# Patient Record
Sex: MALE | Race: BLACK OR AFRICAN AMERICAN | NOT HISPANIC OR LATINO | URBAN - METROPOLITAN AREA
[De-identification: names, ages, dates, MRNs, and addresses within clinical notes are randomized per-mention and may not be internally consistent; named-entity substitution may affect disease eponyms.]

---

## 2019-09-04 ENCOUNTER — OFFICE VISIT - HEALTHEAST (OUTPATIENT)
Dept: INTERNAL MEDICINE | Facility: CLINIC | Age: 39
End: 2019-09-04

## 2019-09-04 DIAGNOSIS — F41.9 ANXIETY DISORDER, UNSPECIFIED TYPE: ICD-10-CM

## 2019-09-04 DIAGNOSIS — G47.00 INSOMNIA, UNSPECIFIED TYPE: ICD-10-CM

## 2019-09-04 DIAGNOSIS — M54.50 ACUTE BILATERAL LOW BACK PAIN WITHOUT SCIATICA: ICD-10-CM

## 2019-09-04 DIAGNOSIS — S06.9X9S TRAUMATIC BRAIN INJURY WITH LOSS OF CONSCIOUSNESS, SEQUELA (H): ICD-10-CM

## 2019-09-04 DIAGNOSIS — I10 BENIGN ESSENTIAL HYPERTENSION: ICD-10-CM

## 2019-09-04 DIAGNOSIS — Z72.0 TOBACCO ABUSE: ICD-10-CM

## 2019-09-04 DIAGNOSIS — F20.9 SCHIZOPHRENIA, UNSPECIFIED TYPE (H): ICD-10-CM

## 2019-09-04 DIAGNOSIS — J45.21 MILD INTERMITTENT ASTHMA WITH EXACERBATION: ICD-10-CM

## 2019-09-04 RX ORDER — IBUPROFEN 800 MG/1
800 TABLET, FILM COATED ORAL EVERY 8 HOURS PRN
Qty: 60 TABLET | Refills: 2 | Status: SHIPPED | OUTPATIENT
Start: 2019-09-04 | End: 2022-08-17

## 2019-09-04 RX ORDER — ACETAMINOPHEN 500 MG
1000 TABLET ORAL EVERY 6 HOURS PRN
Qty: 100 TABLET | Refills: 2 | Status: SHIPPED | OUTPATIENT
Start: 2019-09-04 | End: 2023-09-27

## 2019-09-04 ASSESSMENT — MIFFLIN-ST. JEOR: SCORE: 2045.5

## 2019-09-25 ENCOUNTER — OFFICE VISIT - HEALTHEAST (OUTPATIENT)
Dept: INTERNAL MEDICINE | Facility: CLINIC | Age: 39
End: 2019-09-25

## 2019-09-25 DIAGNOSIS — I10 BENIGN ESSENTIAL HYPERTENSION: ICD-10-CM

## 2019-09-25 DIAGNOSIS — F41.9 ANXIETY DISORDER, UNSPECIFIED TYPE: ICD-10-CM

## 2019-09-25 DIAGNOSIS — J45.21 MILD INTERMITTENT ASTHMA WITH EXACERBATION: ICD-10-CM

## 2019-09-25 DIAGNOSIS — M54.50 CHRONIC BILATERAL LOW BACK PAIN WITHOUT SCIATICA: ICD-10-CM

## 2019-09-25 DIAGNOSIS — J45.41 MODERATE PERSISTENT ASTHMA WITH EXACERBATION: ICD-10-CM

## 2019-09-25 DIAGNOSIS — Z00.00 ROUTINE GENERAL MEDICAL EXAMINATION AT A HEALTH CARE FACILITY: ICD-10-CM

## 2019-09-25 DIAGNOSIS — Z72.0 TOBACCO ABUSE: ICD-10-CM

## 2019-09-25 DIAGNOSIS — G89.29 CHRONIC BILATERAL LOW BACK PAIN WITHOUT SCIATICA: ICD-10-CM

## 2019-09-25 DIAGNOSIS — Z23 INFLUENZA VACCINATION ADMINISTERED AT CURRENT VISIT: ICD-10-CM

## 2019-09-25 LAB
ALBUMIN SERPL-MCNC: 3.9 G/DL (ref 3.5–5)
ALP SERPL-CCNC: 67 U/L (ref 45–120)
ALT SERPL W P-5'-P-CCNC: 24 U/L (ref 0–45)
ANION GAP SERPL CALCULATED.3IONS-SCNC: 10 MMOL/L (ref 5–18)
AST SERPL W P-5'-P-CCNC: 17 U/L (ref 0–40)
BASOPHILS # BLD AUTO: 0 THOU/UL (ref 0–0.2)
BASOPHILS NFR BLD AUTO: 1 % (ref 0–2)
BILIRUB SERPL-MCNC: 0.4 MG/DL (ref 0–1)
BUN SERPL-MCNC: 8 MG/DL (ref 8–22)
CALCIUM SERPL-MCNC: 9.3 MG/DL (ref 8.5–10.5)
CHLORIDE BLD-SCNC: 103 MMOL/L (ref 98–107)
CHOLEST SERPL-MCNC: 200 MG/DL
CO2 SERPL-SCNC: 27 MMOL/L (ref 22–31)
CREAT SERPL-MCNC: 1.11 MG/DL (ref 0.7–1.3)
EOSINOPHIL # BLD AUTO: 0.1 THOU/UL (ref 0–0.4)
EOSINOPHIL NFR BLD AUTO: 2 % (ref 0–6)
ERYTHROCYTE [DISTWIDTH] IN BLOOD BY AUTOMATED COUNT: 13.7 % (ref 11–14.5)
FASTING STATUS PATIENT QL REPORTED: YES
GFR SERPL CREATININE-BSD FRML MDRD: >60 ML/MIN/1.73M2
GLUCOSE BLD-MCNC: 98 MG/DL (ref 70–125)
HCT VFR BLD AUTO: 46.4 % (ref 40–54)
HDLC SERPL-MCNC: 71 MG/DL
HGB BLD-MCNC: 15.1 G/DL (ref 14–18)
HIV 1+2 AB+HIV1 P24 AG SERPL QL IA: NEGATIVE
LDLC SERPL CALC-MCNC: 111 MG/DL
LYMPHOCYTES # BLD AUTO: 1.2 THOU/UL (ref 0.8–4.4)
LYMPHOCYTES NFR BLD AUTO: 19 % (ref 20–40)
MCH RBC QN AUTO: 32.9 PG (ref 27–34)
MCHC RBC AUTO-ENTMCNC: 32.5 G/DL (ref 32–36)
MCV RBC AUTO: 101 FL (ref 80–100)
MONOCYTES # BLD AUTO: 0.5 THOU/UL (ref 0–0.9)
MONOCYTES NFR BLD AUTO: 7 % (ref 2–10)
NEUTROPHILS # BLD AUTO: 4.5 THOU/UL (ref 2–7.7)
NEUTROPHILS NFR BLD AUTO: 71 % (ref 50–70)
PLATELET # BLD AUTO: 152 THOU/UL (ref 140–440)
PMV BLD AUTO: 12.6 FL (ref 8.5–12.5)
POTASSIUM BLD-SCNC: 3.8 MMOL/L (ref 3.5–5)
PROT SERPL-MCNC: 7.1 G/DL (ref 6–8)
RBC # BLD AUTO: 4.59 MILL/UL (ref 4.4–6.2)
SODIUM SERPL-SCNC: 140 MMOL/L (ref 136–145)
TRIGL SERPL-MCNC: 92 MG/DL
TSH SERPL DL<=0.005 MIU/L-ACNC: 0.37 UIU/ML (ref 0.3–5)
WBC: 6.4 THOU/UL (ref 4–11)

## 2019-09-25 ASSESSMENT — MIFFLIN-ST. JEOR: SCORE: 2054.57

## 2019-09-26 LAB — HCV AB SERPL QL IA: NEGATIVE

## 2019-09-27 ENCOUNTER — COMMUNICATION - HEALTHEAST (OUTPATIENT)
Dept: INTERNAL MEDICINE | Facility: CLINIC | Age: 39
End: 2019-09-27

## 2020-02-19 ENCOUNTER — OFFICE VISIT - HEALTHEAST (OUTPATIENT)
Dept: INTERNAL MEDICINE | Facility: CLINIC | Age: 40
End: 2020-02-19

## 2020-02-19 DIAGNOSIS — Z00.00 ROUTINE GENERAL MEDICAL EXAMINATION AT A HEALTH CARE FACILITY: ICD-10-CM

## 2020-02-19 DIAGNOSIS — G89.29 CHRONIC BILATERAL LOW BACK PAIN WITHOUT SCIATICA: ICD-10-CM

## 2020-02-19 DIAGNOSIS — J45.21 MILD INTERMITTENT ASTHMA WITH EXACERBATION: ICD-10-CM

## 2020-02-19 DIAGNOSIS — F20.9 SCHIZOPHRENIA, UNSPECIFIED TYPE (H): ICD-10-CM

## 2020-02-19 DIAGNOSIS — I10 BENIGN ESSENTIAL HYPERTENSION: ICD-10-CM

## 2020-02-19 DIAGNOSIS — Z11.4 SCREENING FOR HIV (HUMAN IMMUNODEFICIENCY VIRUS): ICD-10-CM

## 2020-02-19 DIAGNOSIS — F41.9 ANXIETY DISORDER, UNSPECIFIED TYPE: ICD-10-CM

## 2020-02-19 DIAGNOSIS — Z11.3 SCREEN FOR STD (SEXUALLY TRANSMITTED DISEASE): ICD-10-CM

## 2020-02-19 DIAGNOSIS — M54.50 CHRONIC BILATERAL LOW BACK PAIN WITHOUT SCIATICA: ICD-10-CM

## 2020-02-19 LAB — HIV 1+2 AB+HIV1 P24 AG SERPL QL IA: NEGATIVE

## 2020-02-19 ASSESSMENT — MIFFLIN-ST. JEOR: SCORE: 2186.68

## 2020-02-20 ENCOUNTER — COMMUNICATION - HEALTHEAST (OUTPATIENT)
Dept: INTERNAL MEDICINE | Facility: CLINIC | Age: 40
End: 2020-02-20

## 2020-02-20 LAB
C TRACH DNA SPEC QL PROBE+SIG AMP: NEGATIVE
N GONORRHOEA DNA SPEC QL NAA+PROBE: NEGATIVE

## 2020-08-18 ENCOUNTER — RECORDS - HEALTHEAST (OUTPATIENT)
Dept: LAB | Facility: CLINIC | Age: 40
End: 2020-08-18

## 2020-08-19 LAB
ANION GAP SERPL CALCULATED.3IONS-SCNC: 10 MMOL/L (ref 5–18)
BASOPHILS # BLD AUTO: 0.1 THOU/UL (ref 0–0.2)
BASOPHILS NFR BLD AUTO: 1 % (ref 0–2)
BUN SERPL-MCNC: 16 MG/DL (ref 8–22)
CALCIUM SERPL-MCNC: 9.8 MG/DL (ref 8.5–10.5)
CHLORIDE BLD-SCNC: 101 MMOL/L (ref 98–107)
CO2 SERPL-SCNC: 27 MMOL/L (ref 22–31)
CREAT SERPL-MCNC: 1.03 MG/DL (ref 0.7–1.3)
EOSINOPHIL # BLD AUTO: 0.4 THOU/UL (ref 0–0.4)
EOSINOPHIL NFR BLD AUTO: 3 % (ref 0–6)
ERYTHROCYTE [DISTWIDTH] IN BLOOD BY AUTOMATED COUNT: 12.9 % (ref 11–14.5)
GFR SERPL CREATININE-BSD FRML MDRD: >60 ML/MIN/1.73M2
GLUCOSE BLD-MCNC: 89 MG/DL (ref 70–125)
HCT VFR BLD AUTO: 32 % (ref 40–54)
HGB BLD-MCNC: 9.9 G/DL (ref 14–18)
IMM GRANULOCYTES # BLD: 0.3 THOU/UL
IMM GRANULOCYTES NFR BLD: 2 %
LYMPHOCYTES # BLD AUTO: 2.3 THOU/UL (ref 0.8–4.4)
LYMPHOCYTES NFR BLD AUTO: 18 % (ref 20–40)
MCH RBC QN AUTO: 30.2 PG (ref 27–34)
MCHC RBC AUTO-ENTMCNC: 30.9 G/DL (ref 32–36)
MCV RBC AUTO: 98 FL (ref 80–100)
MONOCYTES # BLD AUTO: 0.9 THOU/UL (ref 0–0.9)
MONOCYTES NFR BLD AUTO: 8 % (ref 2–10)
NEUTROPHILS # BLD AUTO: 8.5 THOU/UL (ref 2–7.7)
NEUTROPHILS NFR BLD AUTO: 68 % (ref 50–70)
PLATELET # BLD AUTO: 406 THOU/UL (ref 140–440)
PMV BLD AUTO: 10.3 FL (ref 8.5–12.5)
POTASSIUM BLD-SCNC: 4 MMOL/L (ref 3.5–5)
RBC # BLD AUTO: 3.28 MILL/UL (ref 4.4–6.2)
SODIUM SERPL-SCNC: 138 MMOL/L (ref 136–145)
WBC: 12.5 THOU/UL (ref 4–11)

## 2020-09-08 ENCOUNTER — COMMUNICATION - HEALTHEAST (OUTPATIENT)
Dept: INTERNAL MEDICINE | Facility: CLINIC | Age: 40
End: 2020-09-08

## 2020-09-10 ENCOUNTER — OFFICE VISIT - HEALTHEAST (OUTPATIENT)
Dept: INTERNAL MEDICINE | Facility: CLINIC | Age: 40
End: 2020-09-10

## 2020-09-10 DIAGNOSIS — F10.20 SEVERE ALCOHOL USE DISORDER (H): ICD-10-CM

## 2020-09-10 DIAGNOSIS — S82.141A CLOSED FRACTURE OF RIGHT TIBIAL PLATEAU, INITIAL ENCOUNTER: ICD-10-CM

## 2020-09-10 DIAGNOSIS — F20.9 SCHIZOPHRENIA, UNSPECIFIED TYPE (H): ICD-10-CM

## 2020-09-10 DIAGNOSIS — T79.A21A TRAUMATIC COMPARTMENT SYNDROME OF RIGHT LOWER EXTREMITY, INITIAL ENCOUNTER (H): ICD-10-CM

## 2020-09-10 ASSESSMENT — MIFFLIN-ST. JEOR: SCORE: 2281.94

## 2020-09-11 ENCOUNTER — RECORDS - HEALTHEAST (OUTPATIENT)
Dept: ADMINISTRATIVE | Facility: OTHER | Age: 40
End: 2020-09-11

## 2020-09-16 ENCOUNTER — COMMUNICATION - HEALTHEAST (OUTPATIENT)
Dept: INTERNAL MEDICINE | Facility: CLINIC | Age: 40
End: 2020-09-16

## 2020-09-16 ENCOUNTER — RECORDS - HEALTHEAST (OUTPATIENT)
Dept: ADMINISTRATIVE | Facility: OTHER | Age: 40
End: 2020-09-16

## 2020-09-16 ENCOUNTER — COMMUNICATION - HEALTHEAST (OUTPATIENT)
Dept: SCHEDULING | Facility: CLINIC | Age: 40
End: 2020-09-16

## 2020-09-16 DIAGNOSIS — I10 BENIGN ESSENTIAL HYPERTENSION: ICD-10-CM

## 2020-09-23 ENCOUNTER — RECORDS - HEALTHEAST (OUTPATIENT)
Dept: ADMINISTRATIVE | Facility: OTHER | Age: 40
End: 2020-09-23

## 2020-09-24 ENCOUNTER — RECORDS - HEALTHEAST (OUTPATIENT)
Dept: ADMINISTRATIVE | Facility: OTHER | Age: 40
End: 2020-09-24

## 2020-09-29 ENCOUNTER — RECORDS - HEALTHEAST (OUTPATIENT)
Dept: ADMINISTRATIVE | Facility: OTHER | Age: 40
End: 2020-09-29

## 2020-10-06 ENCOUNTER — RECORDS - HEALTHEAST (OUTPATIENT)
Dept: LAB | Facility: CLINIC | Age: 40
End: 2020-10-06

## 2020-10-07 LAB
ANION GAP SERPL CALCULATED.3IONS-SCNC: 9 MMOL/L (ref 5–18)
BUN SERPL-MCNC: 17 MG/DL (ref 8–22)
CALCIUM SERPL-MCNC: 9.2 MG/DL (ref 8.5–10.5)
CHLORIDE BLD-SCNC: 101 MMOL/L (ref 98–107)
CO2 SERPL-SCNC: 30 MMOL/L (ref 22–31)
CREAT SERPL-MCNC: 1.03 MG/DL (ref 0.7–1.3)
GFR SERPL CREATININE-BSD FRML MDRD: >60 ML/MIN/1.73M2
GLUCOSE BLD-MCNC: 106 MG/DL (ref 70–125)
POTASSIUM BLD-SCNC: 3.5 MMOL/L (ref 3.5–5)
SODIUM SERPL-SCNC: 140 MMOL/L (ref 136–145)

## 2020-10-15 ENCOUNTER — RECORDS - HEALTHEAST (OUTPATIENT)
Dept: LAB | Facility: CLINIC | Age: 40
End: 2020-10-15

## 2020-10-19 LAB
ANION GAP SERPL CALCULATED.3IONS-SCNC: 7 MMOL/L (ref 5–18)
BUN SERPL-MCNC: 15 MG/DL (ref 8–22)
CALCIUM SERPL-MCNC: 9.8 MG/DL (ref 8.5–10.5)
CHLORIDE BLD-SCNC: 100 MMOL/L (ref 98–107)
CO2 SERPL-SCNC: 31 MMOL/L (ref 22–31)
CREAT SERPL-MCNC: 0.96 MG/DL (ref 0.7–1.3)
GFR SERPL CREATININE-BSD FRML MDRD: >60 ML/MIN/1.73M2
GLUCOSE BLD-MCNC: 83 MG/DL (ref 70–125)
POTASSIUM BLD-SCNC: 4.1 MMOL/L (ref 3.5–5)
SODIUM SERPL-SCNC: 138 MMOL/L (ref 136–145)

## 2020-10-20 ENCOUNTER — RECORDS - HEALTHEAST (OUTPATIENT)
Dept: LAB | Facility: CLINIC | Age: 40
End: 2020-10-20

## 2020-10-21 LAB
ANION GAP SERPL CALCULATED.3IONS-SCNC: 10 MMOL/L (ref 5–18)
BUN SERPL-MCNC: 11 MG/DL (ref 8–22)
CALCIUM SERPL-MCNC: 9.7 MG/DL (ref 8.5–10.5)
CHLORIDE BLD-SCNC: 101 MMOL/L (ref 98–107)
CO2 SERPL-SCNC: 29 MMOL/L (ref 22–31)
CREAT SERPL-MCNC: 0.95 MG/DL (ref 0.7–1.3)
GFR SERPL CREATININE-BSD FRML MDRD: >60 ML/MIN/1.73M2
GLUCOSE BLD-MCNC: 99 MG/DL (ref 70–125)
POTASSIUM BLD-SCNC: 4.1 MMOL/L (ref 3.5–5)
SODIUM SERPL-SCNC: 140 MMOL/L (ref 136–145)

## 2020-10-23 ENCOUNTER — RECORDS - HEALTHEAST (OUTPATIENT)
Dept: ADMINISTRATIVE | Facility: OTHER | Age: 40
End: 2020-10-23

## 2020-10-23 ENCOUNTER — RECORDS - HEALTHEAST (OUTPATIENT)
Dept: LAB | Facility: CLINIC | Age: 40
End: 2020-10-23

## 2020-10-26 LAB
ANION GAP SERPL CALCULATED.3IONS-SCNC: 11 MMOL/L (ref 5–18)
BUN SERPL-MCNC: 14 MG/DL (ref 8–22)
CALCIUM SERPL-MCNC: 9.7 MG/DL (ref 8.5–10.5)
CHLORIDE BLD-SCNC: 101 MMOL/L (ref 98–107)
CO2 SERPL-SCNC: 25 MMOL/L (ref 22–31)
CREAT SERPL-MCNC: 0.93 MG/DL (ref 0.7–1.3)
GFR SERPL CREATININE-BSD FRML MDRD: >60 ML/MIN/1.73M2
GLUCOSE BLD-MCNC: 89 MG/DL (ref 70–125)
POTASSIUM BLD-SCNC: 3.7 MMOL/L (ref 3.5–5)
SODIUM SERPL-SCNC: 137 MMOL/L (ref 136–145)

## 2021-01-27 ENCOUNTER — COMMUNICATION - HEALTHEAST (OUTPATIENT)
Dept: FAMILY MEDICINE | Facility: CLINIC | Age: 41
End: 2021-01-27

## 2021-01-27 DIAGNOSIS — F41.9 ANXIETY DISORDER, UNSPECIFIED TYPE: ICD-10-CM

## 2021-01-27 DIAGNOSIS — I10 BENIGN ESSENTIAL HYPERTENSION: ICD-10-CM

## 2021-01-27 DIAGNOSIS — J45.21 MILD INTERMITTENT ASTHMA WITH EXACERBATION: ICD-10-CM

## 2021-01-27 DIAGNOSIS — F20.9 SCHIZOPHRENIA, UNSPECIFIED TYPE (H): ICD-10-CM

## 2021-01-27 DIAGNOSIS — G47.00 INSOMNIA, UNSPECIFIED TYPE: ICD-10-CM

## 2021-02-01 ENCOUNTER — COMMUNICATION - HEALTHEAST (OUTPATIENT)
Dept: SCHEDULING | Facility: CLINIC | Age: 41
End: 2021-02-01

## 2021-02-01 DIAGNOSIS — J45.21 MILD INTERMITTENT ASTHMA WITH EXACERBATION: ICD-10-CM

## 2021-05-28 ASSESSMENT — ASTHMA QUESTIONNAIRES
ACT_TOTALSCORE: 25
ACT_TOTALSCORE: 12
ACT_TOTALSCORE: 25
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
## 2021-06-03 VITALS
OXYGEN SATURATION: 99 % | BODY MASS INDEX: 28.72 KG/M2 | DIASTOLIC BLOOD PRESSURE: 98 MMHG | HEART RATE: 66 BPM | WEIGHT: 231 LBS | SYSTOLIC BLOOD PRESSURE: 158 MMHG | TEMPERATURE: 98.4 F | HEIGHT: 75 IN

## 2021-06-03 VITALS
DIASTOLIC BLOOD PRESSURE: 90 MMHG | HEART RATE: 82 BPM | WEIGHT: 233 LBS | BODY MASS INDEX: 28.97 KG/M2 | OXYGEN SATURATION: 97 % | SYSTOLIC BLOOD PRESSURE: 138 MMHG | HEIGHT: 75 IN

## 2021-06-04 VITALS
HEIGHT: 74 IN | OXYGEN SATURATION: 96 % | BODY MASS INDEX: 33.75 KG/M2 | DIASTOLIC BLOOD PRESSURE: 82 MMHG | SYSTOLIC BLOOD PRESSURE: 122 MMHG | WEIGHT: 263 LBS | HEART RATE: 84 BPM

## 2021-06-05 VITALS
HEIGHT: 74 IN | OXYGEN SATURATION: 97 % | BODY MASS INDEX: 36.45 KG/M2 | HEART RATE: 89 BPM | DIASTOLIC BLOOD PRESSURE: 78 MMHG | WEIGHT: 284 LBS | SYSTOLIC BLOOD PRESSURE: 132 MMHG

## 2021-06-06 NOTE — PROGRESS NOTES
Office Visit - Physical   Valente Pope Jr.   39 y.o.  male    Date of visit: 2/19/2020  Physician: Steven Mcgovern MD     Assessment and Plan   1. Routine general medical examination at a health care facility  This is a 39-year-old man with issues as discussed below    2. Mild intermittent asthma with exacerbation  Stable but discussed importance of smoking cessation.  Discussed FDA approved smoking cessation modalities including pharmacologic therapy and nicotine supplementation.  He will consider these  - albuterol (PROAIR HFA;PROVENTIL HFA;VENTOLIN HFA) 90 mcg/actuation inhaler; Inhale 1-2 puffs every 4 (four) hours as needed for wheezing.  Dispense: 1 Inhaler; Refill: 11    3. Schizophrenia, unspecified type (H)  Currently on risperidone Seroquel Wellbutrin and trazodone.  I discussed with him that he will need ongoing treatment from a psychiatrist and recommended he [facility]    4. Benign essential hypertension  Blood pressure controlled continue current medications, labs recently reviewed and look okay    5. Anxiety disorder, unspecified type  As above    6. Chronic bilateral low back pain without sciatica  Stable, no red flags, continue ibuprofen as needed    7. Screening for HIV (human immunodeficiency virus)   HIV Antigen/Antibody Screening [place]    8. Screen for STD (sexually transmitted disease)  - Chlamydia trachomatis & Neisseria gonorrhoeae, Amplified Detection    Return in about 3 months (around 5/19/2020) for recheck.     Chief Complaint   Chief Complaint   Patient presents with     Annual Exam     Establish Care        Patient Profile   Social History     Social History Narrative    Lives with his Auntie.  Not working.        Past Medical History   Patient Active Problem List   Diagnosis     Anxiety disorder     Schizophrenia (H) - Sharona Tabor, [facility]     Benign essential hypertension     Mild intermittent asthma with exacerbation      Tobacco abuse     TBI (traumatic brain injury) (H)     Bilateral low back pain without sciatica       Past Surgical History  He has a past surgical history that includes No past surgeries.     History of Present Illness   This 39 y.o. old man comes in to \[facility]\"" care and for annual physical.  Previous notes from Dr. Walt Rios reviewed.  Overall doing okay.  Remains sober.  Tolerating medications for schizophrenia.  Not currently seeing a psychiatrist.  Asthma generally stable.  Continues to smoke cigarettes is interested in quitting.  Has had high blood pressure but under excellent control in the guidance of Dr. Marco wheeler.  No lightheadedness dizziness chest pain.  Some back pain which is chronic and stable.    Review of Systems: A comprehensive review of systems was negative except as noted.     Medications and Allergies   Current Outpatient Medications   Medication Sig Dispense Refill     acetaminophen (TYLENOL EXTRA STRENGTH) 500 MG tablet Take 2 tablets (1,000 mg total) by mouth every 6 (six) hours as needed for pain. 100 tablet 2     albuterol (PROAIR HFA;PROVENTIL HFA;VENTOLIN HFA) 90 mcg/actuation inhaler Inhale 1-2 puffs every 4 (four) hours as needed for wheezing. 1 Inhaler 11     atenolol (TENORMIN) 50 MG tablet Take 1 tablet (50 mg total) by mouth daily. 30 tablet 11     buPROPion (WELLBUTRIN XL) 300 MG 24 hr tablet Take 1 tablet (300 mg total) by mouth daily. 30 tablet 11     fluticasone propionate (FLOVENT HFA) 44 mcg/actuation inhaler Inhale 2 puffs 2 (two) times a day. 1 Inhaler 2     hydroCHLOROthiazide (HYDRODIURIL) 12.5 MG tablet Take 1 tablet (12.5 mg total) by mouth daily. 30 tablet 11     ibuprofen (ADVIL,MOTRIN) 800 MG tablet Take 1 tablet (800 mg total) by mouth every 8 (eight) hours as needed for pain. 60 tablet 2     QUEtiapine (SEROQUEL) 50 MG tablet Take 1 tablet (50 mg total) by mouth at bedtime. 30 tablet 11     risperiDONE (RISPERDAL) 1 MG tablet Take 1 tablet (1 mg total) by  "mouth 2 (two) times a day. 60 tablet 11     traZODone (DESYREL) 100 MG tablet Take 1 tablet (100 mg total) by mouth at bedtime. 30 tablet 11     No current facility-administered medications for this visit.      Allergies   Allergen Reactions     Lisinopril Angioedema     Penicillins Other (See Comments) and Swelling     Other reaction(s): Throat Swelling/Closing        Family and Social History   Family History   Problem Relation Age of Onset     Hypertension Father      Hypertension Mother      No Medical Problems Sister      No Medical Problems Brother      No Medical Problems Sister      No Medical Problems Sister      No Medical Problems Brother      No Medical Problems Daughter         Social History     Tobacco Use     Smoking status: Current Every Day Smoker     Types: Cigarettes     Smokeless tobacco: Never Used   Substance Use Topics     Alcohol use: Not Currently     Drug use: Not Currently        Physical Exam   General Appearance:   No acute distress    /82 (Patient Site: Left Arm, Patient Position: Sitting, Cuff Size: Adult Regular)   Pulse 84   Ht 6' 2.25\" (1.886 m)   Wt (!) 263 lb (119.3 kg)   SpO2 96%   BMI 33.54 kg/m      EYES: Eyelids, conjunctiva, and sclera were normal. Pupils were normal. Cornea, iris, and lens were normal bilaterally.  HEAD, EARS, NOSE, MOUTH, AND THROAT: Head and face were normal. Hearing was normal to voice and the ears were normal to external exam. Nose appearance was normal and there was no discharge. Oropharynx was normal.  NECK: Neck appearance was normal. There were no neck masses and the thyroid was not enlarged.  RESPIRATORY: Breathing pattern was normal and the chest moved symmetrically.  Percussion/auscultatory percussion was normal.  Lung sounds were normal and there were no abnormal sounds.  CARDIOVASCULAR: Heart rate and rhythm were normal.  S1 and S2 were normal and there were no extra sounds or murmurs. Peripheral pulses in arms and legs were normal.  " Jugular venous pressure was normal.  There was no peripheral edema.  GASTROINTESTINAL: The abdomen was normal in contour.  Bowel sounds were present.  Percussion detected no organ enlargement or tenderness.  Palpation detected no tenderness, mass, or enlarged organs.   MUSCULOSKELETAL: Skeletal configuration was normal and muscle mass was normal for age. Joint appearance was overall normal.  LYMPHATIC: There were no enlarged nodes.  SKIN/HAIR/NAILS: Skin color was normal.  There were no skin lesions.  Hair and nails were normal.  NEUROLOGIC: The patient was alert and oriented to person, place, time, and circumstance. Speech was normal. Cranial nerves were normal. Motor strength was normal for age. The patient was normally coordinated.  PSYCHIATRIC:  Mood and affect were normal and the patient had normal recent and remote memory. The patient's judgment and insight were normal.       Additional Information        Steven Mcgovern MD  Internal Medicine  Contact me at None

## 2021-06-08 ENCOUNTER — COMMUNICATION - HEALTHEAST (OUTPATIENT)
Dept: INTERNAL MEDICINE | Facility: CLINIC | Age: 41
End: 2021-06-08

## 2021-06-08 DIAGNOSIS — I10 BENIGN ESSENTIAL HYPERTENSION: ICD-10-CM

## 2021-06-08 DIAGNOSIS — G47.00 INSOMNIA, UNSPECIFIED TYPE: ICD-10-CM

## 2021-06-08 DIAGNOSIS — M54.50 CHRONIC BILATERAL LOW BACK PAIN WITHOUT SCIATICA: ICD-10-CM

## 2021-06-08 DIAGNOSIS — G89.29 CHRONIC BILATERAL LOW BACK PAIN WITHOUT SCIATICA: ICD-10-CM

## 2021-06-08 DIAGNOSIS — F41.9 ANXIETY DISORDER, UNSPECIFIED TYPE: ICD-10-CM

## 2021-06-08 DIAGNOSIS — F20.9 SCHIZOPHRENIA, UNSPECIFIED TYPE (H): ICD-10-CM

## 2021-06-08 DIAGNOSIS — J45.21 MILD INTERMITTENT ASTHMA WITH EXACERBATION: ICD-10-CM

## 2021-06-09 RX ORDER — HYDROCHLOROTHIAZIDE 25 MG/1
25 TABLET ORAL DAILY
Qty: 90 TABLET | Refills: 1 | Status: SHIPPED | OUTPATIENT
Start: 2021-06-09 | End: 2022-02-07

## 2021-06-09 RX ORDER — QUETIAPINE FUMARATE 50 MG/1
50 TABLET, FILM COATED ORAL AT BEDTIME
Qty: 90 TABLET | Refills: 1 | Status: SHIPPED | OUTPATIENT
Start: 2021-06-09 | End: 2022-02-07

## 2021-06-09 RX ORDER — HYDROXYZINE HYDROCHLORIDE 25 MG/1
25-50 TABLET, FILM COATED ORAL EVERY 8 HOURS PRN
Qty: 90 TABLET | Refills: 1 | Status: SHIPPED | OUTPATIENT
Start: 2021-06-09 | End: 2022-02-07

## 2021-06-09 RX ORDER — ATENOLOL 50 MG/1
50 TABLET ORAL DAILY
Qty: 90 TABLET | Refills: 1 | Status: SHIPPED | OUTPATIENT
Start: 2021-06-09 | End: 2022-02-07

## 2021-06-09 RX ORDER — TRAZODONE HYDROCHLORIDE 100 MG/1
100 TABLET ORAL AT BEDTIME
Qty: 90 TABLET | Refills: 1 | Status: SHIPPED | OUTPATIENT
Start: 2021-06-09 | End: 2022-02-07

## 2021-06-09 RX ORDER — BUPROPION HYDROCHLORIDE 300 MG/1
300 TABLET ORAL DAILY
Qty: 90 TABLET | Refills: 1 | Status: SHIPPED | OUTPATIENT
Start: 2021-06-09 | End: 2022-02-07

## 2021-06-09 RX ORDER — GABAPENTIN 300 MG/1
300 CAPSULE ORAL DAILY
Qty: 90 CAPSULE | Refills: 1 | Status: SHIPPED | OUTPATIENT
Start: 2021-06-09 | End: 2022-02-07

## 2021-06-09 RX ORDER — ALBUTEROL SULFATE 90 UG/1
1-2 AEROSOL, METERED RESPIRATORY (INHALATION) EVERY 4 HOURS PRN
Qty: 1 INHALER | Refills: 5 | Status: SHIPPED | OUTPATIENT
Start: 2021-06-09 | End: 2022-02-07

## 2021-06-09 RX ORDER — RISPERIDONE 1 MG/1
1 TABLET ORAL 2 TIMES DAILY
Qty: 180 TABLET | Refills: 1 | Status: SHIPPED | OUTPATIENT
Start: 2021-06-09 | End: 2022-02-07

## 2021-06-09 RX ORDER — CYCLOBENZAPRINE HCL 10 MG
10 TABLET ORAL 2 TIMES DAILY PRN
Qty: 90 TABLET | Refills: 1 | Status: SHIPPED | OUTPATIENT
Start: 2021-06-09 | End: 2022-08-17

## 2021-06-11 NOTE — TELEPHONE ENCOUNTER
[facility] care nurse.  [phone]  doing Ist homecare visit  post surgical.    Patient reported to have;   High BP.    185/110  Just now.    Takes atenolol  [facility] reports;   Just took Atenolol.now , because he states he forget to take it earlier.    He was taken off of hydrochlorothiazide.   And he reports he does not know why he was taken of of it.    Completed his Lovenox, and has mild swelling in his right leg.    Surgery for right tibial plataeu fx; with emergent fasciotomy and compartment syndrome.    Call patient directly if he will get a prescription for HCTZ, and update Jeannine as well.  Patient   [phone]    Ronit Akins RN  [facility] Triage/refill nurse

## 2021-06-11 NOTE — PROGRESS NOTES
Office Visit - Follow Up   Valente Pope Jr.   40 y.o. male    Date of Visit: 9/10/2020    Chief Complaint   Patient presents with     Hospital Visit Follow Up        Assessment and Plan   1. Traumatic compartment syndrome of right lower extremity, initial encounter (H)  2. Closed fracture of right tibial plateau, initial encounter  Patient had fasciotomies on 8/8/2020 and ORIF of the tibial plateau fracture on 8/12/2020.  Pain management solely per his orthopedic surgeon who is recommending hydroxyzine, gabapentin, muscle relaxant and ibuprofen.  I discussed that any opioids would need to come from his surgeon.  He is to remain nonweightbearing for 12 weeks.  He is working with physical therapy and has a home nurse.  He is going to have remaining sutures removed at follow-up visit with Dr. Sharon starks from.    3. Schizophrenia, unspecified type (H)  Continue current medications follow-up with psychiatry    4. Severe alcohol use disorder (H)  Discussed importance of sobriety, not currently in treatment    Return in about 4 weeks (around 10/8/2020) for recheck.     History of Present Illness   This 40 y.o. old man comes in for hospital follow-up.  He was admitted to [facility] in the first part of August.  He had been drinking alcohol fell down the stairs.  He injured his knee went to sleep woke up and had a lot of pain.  It appears that he developed compartment syndrome along with a tibial plateau fracture.  He underwent fasciotomies and fixation.  He is in a straight leg brace.  He was discharged to a skilled nursing facility is now at home.  He has home care including skilled nursing and physical therapy and an aide.  He was on oxycodone time discharge and his surgeon has decided that this is no longer required for pain control and has prescribed hydroxyzine ibuprofen gabapentin and muscle relaxant.  His main concern today is pain and would like to start back on oxycodone.  He states he is  "not drinking alcohol.  It is hard to ascertain what other medications he is taking as this focuses mostly on oxycodone.    Review of Systems: A comprehensive review of systems was negative except as noted.     Medications, Allergies and Problem List   Reviewed, reconciled and updated  Post Discharge Medication Reconciliation Status:      Physical Exam   General Appearance:   No acute distress    /78 (Patient Site: Left Arm, Patient Position: Sitting, Cuff Size: Adult Regular)   Pulse 89   Ht 6' 2.25\" (1.886 m)   Wt (!) 284 lb (128.8 kg)   SpO2 97%   BMI 36.22 kg/m      His right leg is in a brace locked at 10 degrees.     Additional Information   Current Outpatient Medications   Medication Sig Dispense Refill     acetaminophen (TYLENOL EXTRA STRENGTH) 500 MG tablet Take 2 tablets (1,000 mg total) by mouth every 6 (six) hours as needed for pain. 100 tablet 2     albuterol (PROAIR HFA;PROVENTIL HFA;VENTOLIN HFA) 90 mcg/actuation inhaler Inhale 1-2 puffs every 4 (four) hours as needed for wheezing. 1 Inhaler 11     aspirin 81 MG EC tablet Take 162 mg by mouth.       atenolol (TENORMIN) 50 MG tablet Take 1 tablet (50 mg total) by mouth daily. 30 tablet 11     buPROPion (WELLBUTRIN XL) 300 MG 24 hr tablet Take 1 tablet (300 mg total) by mouth daily. 30 tablet 11     cyclobenzaprine (FLEXERIL) 10 MG tablet Take 10 mg by mouth.       fluticasone propionate (FLOVENT HFA) 44 mcg/actuation inhaler Inhale 2 puffs 2 (two) times a day. 1 Inhaler 2     gabapentin (NEURONTIN) 300 MG capsule Take 300 mg by mouth.       hydroCHLOROthiazide (HYDRODIURIL) 12.5 MG tablet Take 1 tablet (12.5 mg total) by mouth daily. 30 tablet 11     hydrOXYzine HCL (ATARAX) 25 MG tablet Take 25-50 mg by mouth.       ibuprofen (ADVIL,MOTRIN) 800 MG tablet Take 1 tablet (800 mg total) by mouth every 8 (eight) hours as needed for pain. 60 tablet 2     QUEtiapine (SEROQUEL) 50 MG tablet Take 1 tablet (50 mg total) by mouth at bedtime. 30 tablet " 11     risperiDONE (RISPERDAL) 1 MG tablet Take 1 tablet (1 mg total) by mouth 2 (two) times a day. 60 tablet 11     traZODone (DESYREL) 100 MG tablet Take 1 tablet (100 mg total) by mouth at bedtime. 30 tablet 11     No current facility-administered medications for this visit.      Allergies   Allergen Reactions     Lisinopril Angioedema     Penicillins Other (See Comments) and Swelling     Other reaction(s): Throat Swelling/Closing     Social History     Tobacco Use     Smoking status: Current Every Day Smoker     Types: Cigarettes     Smokeless tobacco: Never Used   Substance Use Topics     Alcohol use: Not Currently     Drug use: Not Currently       Review and/or order of clinical lab tests: Reviewed  Review and/or order of radiology tests: Reviewed  Review and/or order of medicine tests:  Discussion of test results with performing physician:  Decision to obtain old records and/or obtain history from someone other than the patient:  Review and summarization of old records and/or obtaining history from someone other than the patient and.or discussion of case with another health care provider: Reviewed his history and physical, discharge summary, consultative notes and operative note, summarized above I also reviewed Sharon Cosby's note from 8/31/2020.  Independent visualization of image, tracing or specimen itself:    Time:      Steven Mcgovern MD

## 2021-06-11 NOTE — TELEPHONE ENCOUNTER
Who is calling:  Crystal   Reason for Call:  Caller is just wanting to check to see if Steven Mcgovern MD will be the  for orders for this patient.   Date of last appointment with primary care:   Okay to leave a detailed message: Yes

## 2021-06-11 NOTE — TELEPHONE ENCOUNTER
Prescription set up and sent to PCP to sign. Patient notified. Left message to notify Jeannine of addition of HCTZ again.    Brittney Gerard, CMA

## 2021-06-14 NOTE — TELEPHONE ENCOUNTER
Refill Approved    Rx renewed per Medication Renewal Policy. Medication was last renewed on 9/4/19.2/19/20.    Edelmira Tony, [facility] Connection Triage/Med Refill 1/27/2021     Requested Prescriptions   Pending Prescriptions Disp Refills     albuterol (PROAIR HFA;PROVENTIL HFA;VENTOLIN HFA) 90 mcg/actuation inhaler 1 Inhaler 11     Sig: Inhale 1-2 puffs every 4 (four) hours as needed for wheezing.       Albuterol/Levalbuterol Refill Protocol Passed - 1/27/2021 11:36 AM        Passed - PCP or prescribing provider visit in last year     Last office visit with prescriber/PCP: 9/10/2020 Steven Mcgovern MD OR same dept: Visit date not found OR same specialty: Visit date not found Last physical: 2/19/2020       Next appt within 3 mo: Visit date not found  Next physical within 3 mo: Visit date not found  Prescriber OR PCP: Steven Mcgovern MD  Last diagnosis associated with med order: 1. Mild intermittent asthma with exacerbation  - albuterol (PROAIR HFA;PROVENTIL HFA;VENTOLIN HFA) 90 mcg/actuation inhaler; Inhale 1-2 puffs every 4 (four) hours as needed for wheezing.  Dispense: 1 Inhaler; Refill: 11    2. Benign essential hypertension  - atenoloL (TENORMIN) 50 MG tablet; Take 1 tablet (50 mg total) by mouth daily.  Dispense: 30 tablet; Refill: 11  - hydroCHLOROthiazide (HYDRODIURIL) 25 MG tablet; Take 1 tablet (25 mg total) by mouth daily.  Dispense: 90 tablet; Refill: 3    3. Anxiety disorder, unspecified type  - buPROPion (WELLBUTRIN XL) 300 MG 24 hr tablet; Take 1 tablet (300 mg total) by mouth daily.  Dispense: 30 tablet; Refill: 11    4. Schizophrenia, unspecified type (H)  - buPROPion (WELLBUTRIN XL) 300 MG 24 hr tablet; Take 1 tablet (300 mg total) by mouth daily.  Dispense: 30 tablet; Refill: 11  - QUEtiapine (SEROQUEL) 50 MG tablet; Take 1 tablet (50 mg total) by mouth at bedtime.  Dispense: 30 tablet; Refill: 11  - risperiDONE (RISPERDAL) 1 MG tablet; Take 1 tablet (1 mg total) by mouth 2 (two) times a  day.  Dispense: 60 tablet; Refill: 11    5. Insomnia, unspecified type  - traZODone (DESYREL) 100 MG tablet; Take 1 tablet (100 mg total) by mouth at bedtime.  Dispense: 30 tablet; Refill: 11    If protocol passes may refill for 6 months if within 3 months of last provider visit (or a total of 9 months). If patient requesting >1 inhaler per month refill x 6 months and have patient make appointment with provider.             atenoloL (TENORMIN) 50 MG tablet 30 tablet 11     Sig: Take 1 tablet (50 mg total) by mouth daily.       Beta-Blockers Refill Protocol Passed - 1/27/2021 11:36 AM        Passed - PCP or prescribing provider visit in past 12 months or next 3 months     Last office visit with prescriber/PCP: 9/10/2020 Steven Mcgovern MD OR same dept: Visit date not found OR same specialty: Visit date not found  Last physical: 2/19/2020 Last MTM visit: Visit date not found   Next visit within 3 mo: Visit date not found  Next physical within 3 mo: Visit date not found  Prescriber OR PCP: Steven Mcgovern MD  Last diagnosis associated with med order: 1. Mild intermittent asthma with exacerbation  - albuterol (PROAIR HFA;PROVENTIL HFA;VENTOLIN HFA) 90 mcg/actuation inhaler; Inhale 1-2 puffs every 4 (four) hours as needed for wheezing.  Dispense: 1 Inhaler; Refill: 11    2. Benign essential hypertension  - atenoloL (TENORMIN) 50 MG tablet; Take 1 tablet (50 mg total) by mouth daily.  Dispense: 30 tablet; Refill: 11  - hydroCHLOROthiazide (HYDRODIURIL) 25 MG tablet; Take 1 tablet (25 mg total) by mouth daily.  Dispense: 90 tablet; Refill: 3    3. Anxiety disorder, unspecified type  - buPROPion (WELLBUTRIN XL) 300 MG 24 hr tablet; Take 1 tablet (300 mg total) by mouth daily.  Dispense: 30 tablet; Refill: 11    4. Schizophrenia, unspecified type (H)  - buPROPion (WELLBUTRIN XL) 300 MG 24 hr tablet; Take 1 tablet (300 mg total) by mouth daily.  Dispense: 30 tablet; Refill: 11  - QUEtiapine (SEROQUEL) 50 MG tablet;  Take 1 tablet (50 mg total) by mouth at bedtime.  Dispense: 30 tablet; Refill: 11  - risperiDONE (RISPERDAL) 1 MG tablet; Take 1 tablet (1 mg total) by mouth 2 (two) times a day.  Dispense: 60 tablet; Refill: 11    5. Insomnia, unspecified type  - traZODone (DESYREL) 100 MG tablet; Take 1 tablet (100 mg total) by mouth at bedtime.  Dispense: 30 tablet; Refill: 11    If protocol passes may refill for 12 months if within 3 months of last provider visit (or a total of 15 months).             Passed - Blood pressure filed in past 12 months     BP Readings from Last 1 Encounters:   09/10/20 132/78                buPROPion (WELLBUTRIN XL) 300 MG 24 hr tablet 30 tablet 11     Sig: Take 1 tablet (300 mg total) by mouth daily.       Tricyclics/Misc Antidepressant/Antianxiety Meds Refill Protocol Passed - 1/27/2021 11:36 AM        Passed - PCP or prescribing provider visit in last year     Last office visit with prescriber/PCP: 9/10/2020 Steven Mcgovern MD OR same dept: Visit date not found OR same specialty: Visit date not found  Last physical: 2/19/2020 Last MTM visit: Visit date not found   Next visit within 3 mo: Visit date not found  Next physical within 3 mo: Visit date not found  Prescriber OR PCP: Steven Mcgovern MD  Last diagnosis associated with med order: 1. Mild intermittent asthma with exacerbation  - albuterol (PROAIR HFA;PROVENTIL HFA;VENTOLIN HFA) 90 mcg/actuation inhaler; Inhale 1-2 puffs every 4 (four) hours as needed for wheezing.  Dispense: 1 Inhaler; Refill: 11    2. Benign essential hypertension  - atenoloL (TENORMIN) 50 MG tablet; Take 1 tablet (50 mg total) by mouth daily.  Dispense: 30 tablet; Refill: 11  - hydroCHLOROthiazide (HYDRODIURIL) 25 MG tablet; Take 1 tablet (25 mg total) by mouth daily.  Dispense: 90 tablet; Refill: 3    3. Anxiety disorder, unspecified type  - buPROPion (WELLBUTRIN XL) 300 MG 24 hr tablet; Take 1 tablet (300 mg total) by mouth daily.  Dispense: 30 tablet;  Refill: 11    4. Schizophrenia, unspecified type (H)  - buPROPion (WELLBUTRIN XL) 300 MG 24 hr tablet; Take 1 tablet (300 mg total) by mouth daily.  Dispense: 30 tablet; Refill: 11  - QUEtiapine (SEROQUEL) 50 MG tablet; Take 1 tablet (50 mg total) by mouth at bedtime.  Dispense: 30 tablet; Refill: 11  - risperiDONE (RISPERDAL) 1 MG tablet; Take 1 tablet (1 mg total) by mouth 2 (two) times a day.  Dispense: 60 tablet; Refill: 11    5. Insomnia, unspecified type  - traZODone (DESYREL) 100 MG tablet; Take 1 tablet (100 mg total) by mouth at bedtime.  Dispense: 30 tablet; Refill: 11    If protocol passes may refill for 12 months if within 3 months of last provider visit (or a total of 15 months).                hydroCHLOROthiazide (HYDRODIURIL) 25 MG tablet 90 tablet 3     Sig: Take 1 tablet (25 mg total) by mouth daily.       Diuretics/Combination Diuretics Refill Protocol  Passed - 1/27/2021 11:36 AM        Passed - Visit with PCP or prescribing provider visit in past 12 months     Last office visit with prescriber/PCP: 9/10/2020 Steven Mcgovern MD OR same dept: Visit date not found OR same specialty: Visit date not found  Last physical: 2/19/2020 Last MTM visit: Visit date not found   Next visit within 3 mo: Visit date not found  Next physical within 3 mo: Visit date not found  Prescriber OR PCP: Steven Mcgovern MD  Last diagnosis associated with med order: 1. Mild intermittent asthma with exacerbation  - albuterol (PROAIR HFA;PROVENTIL HFA;VENTOLIN HFA) 90 mcg/actuation inhaler; Inhale 1-2 puffs every 4 (four) hours as needed for wheezing.  Dispense: 1 Inhaler; Refill: 11    2. Benign essential hypertension  - atenoloL (TENORMIN) 50 MG tablet; Take 1 tablet (50 mg total) by mouth daily.  Dispense: 30 tablet; Refill: 11  - hydroCHLOROthiazide (HYDRODIURIL) 25 MG tablet; Take 1 tablet (25 mg total) by mouth daily.  Dispense: 90 tablet; Refill: 3    3. Anxiety disorder, unspecified type  - buPROPion  (WELLBUTRIN XL) 300 MG 24 hr tablet; Take 1 tablet (300 mg total) by mouth daily.  Dispense: 30 tablet; Refill: 11    4. Schizophrenia, unspecified type (H)  - buPROPion (WELLBUTRIN XL) 300 MG 24 hr tablet; Take 1 tablet (300 mg total) by mouth daily.  Dispense: 30 tablet; Refill: 11  - QUEtiapine (SEROQUEL) 50 MG tablet; Take 1 tablet (50 mg total) by mouth at bedtime.  Dispense: 30 tablet; Refill: 11  - risperiDONE (RISPERDAL) 1 MG tablet; Take 1 tablet (1 mg total) by mouth 2 (two) times a day.  Dispense: 60 tablet; Refill: 11    5. Insomnia, unspecified type  - traZODone (DESYREL) 100 MG tablet; Take 1 tablet (100 mg total) by mouth at bedtime.  Dispense: 30 tablet; Refill: 11    If protocol passes may refill for 12 months if within 3 months of last provider visit (or a total of 15 months).             Passed - Serum Potassium in past 12 months      Lab Results   Component Value Date    Potassium 3.7 10/26/2020             Passed - Serum Sodium in past 12 months      Lab Results   Component Value Date    Sodium 137 10/26/2020             Passed - Blood pressure on file in past 12 months     BP Readings from Last 1 Encounters:   09/10/20 132/78             Passed - Serum Creatinine in past 12 months      Creatinine   Date Value Ref Range Status   10/26/2020 0.93 0.70 - 1.30 mg/dL Final                QUEtiapine (SEROQUEL) 50 MG tablet 30 tablet 11     Sig: Take 1 tablet (50 mg total) by mouth at bedtime.       There is no refill protocol information for this order        risperiDONE (RISPERDAL) 1 MG tablet 60 tablet 11     Sig: Take 1 tablet (1 mg total) by mouth 2 (two) times a day.       There is no refill protocol information for this order        traZODone (DESYREL) 100 MG tablet 30 tablet 11     Sig: Take 1 tablet (100 mg total) by mouth at bedtime.       Tricyclics/Misc Antidepressant/Antianxiety Meds Refill Protocol Passed - 1/27/2021 11:36 AM        Passed - PCP or prescribing provider visit in last year      Last office visit with prescriber/PCP: 9/10/2020 Steven Mcgovern MD OR same dept: Visit date not found OR same specialty: Visit date not found  Last physical: 2/19/2020 Last MTM visit: Visit date not found   Next visit within 3 mo: Visit date not found  Next physical within 3 mo: Visit date not found  Prescriber OR PCP: Steven Mcgovern MD  Last diagnosis associated with med order: 1. Mild intermittent asthma with exacerbation  - albuterol (PROAIR HFA;PROVENTIL HFA;VENTOLIN HFA) 90 mcg/actuation inhaler; Inhale 1-2 puffs every 4 (four) hours as needed for wheezing.  Dispense: 1 Inhaler; Refill: 11    2. Benign essential hypertension  - atenoloL (TENORMIN) 50 MG tablet; Take 1 tablet (50 mg total) by mouth daily.  Dispense: 30 tablet; Refill: 11  - hydroCHLOROthiazide (HYDRODIURIL) 25 MG tablet; Take 1 tablet (25 mg total) by mouth daily.  Dispense: 90 tablet; Refill: 3    3. Anxiety disorder, unspecified type  - buPROPion (WELLBUTRIN XL) 300 MG 24 hr tablet; Take 1 tablet (300 mg total) by mouth daily.  Dispense: 30 tablet; Refill: 11    4. Schizophrenia, unspecified type (H)  - buPROPion (WELLBUTRIN XL) 300 MG 24 hr tablet; Take 1 tablet (300 mg total) by mouth daily.  Dispense: 30 tablet; Refill: 11  - QUEtiapine (SEROQUEL) 50 MG tablet; Take 1 tablet (50 mg total) by mouth at bedtime.  Dispense: 30 tablet; Refill: 11  - risperiDONE (RISPERDAL) 1 MG tablet; Take 1 tablet (1 mg total) by mouth 2 (two) times a day.  Dispense: 60 tablet; Refill: 11    5. Insomnia, unspecified type  - traZODone (DESYREL) 100 MG tablet; Take 1 tablet (100 mg total) by mouth at bedtime.  Dispense: 30 tablet; Refill: 11    If protocol passes may refill for 12 months if within 3 months of last provider visit (or a total of 15 months).

## 2021-06-14 NOTE — TELEPHONE ENCOUNTER
Patient called earlier and is requesting refill on his Albuterol Inhaler asap as he has ran out of them.  He states that he picked up his Albuterol Inhaler and all other medication below about 5 days ago but has used up the inhaler since he has a real bad cold and using it more than normal.    Ok to send to [facility] on [address] [place] [address].  [phone]    Patient would like to know what he needs to do to get a breathing kit also.    Ok to leave a message on voicemail.  Ok to call anytime.

## 2021-06-14 NOTE — TELEPHONE ENCOUNTER
RN cannot approve Refill Request    RN can NOT refill this medication med is not covered by policy/route to provider. Last office visit: 9/10/2020 Steven Mcgovern MD Last Physical: 2/19/2020 Last MTM visit: Visit date not found Last visit same specialty: Visit date not found.  Next visit within 3 mo: Visit date not found  Next physical within 3 mo: Visit date not found      Edelmira Tony, [facility] Connection Triage/Med Refill 1/27/2021    Requested Prescriptions   Pending Prescriptions Disp Refills     QUEtiapine (SEROQUEL) 50 MG tablet 30 tablet 11     Sig: Take 1 tablet (50 mg total) by mouth at bedtime.       There is no refill protocol information for this order        risperiDONE (RISPERDAL) 1 MG tablet 60 tablet 11     Sig: Take 1 tablet (1 mg total) by mouth 2 (two) times a day.       There is no refill protocol information for this order      Signed Prescriptions Disp Refills    albuterol (PROAIR HFA;PROVENTIL HFA;VENTOLIN HFA) 90 mcg/actuation inhaler 1 Inhaler 5     Sig: Inhale 1-2 puffs every 4 (four) hours as needed for wheezing.       Albuterol/Levalbuterol Refill Protocol Passed - 1/27/2021 11:36 AM        Passed - PCP or prescribing provider visit in last year     Last office visit with prescriber/PCP: 9/10/2020 Steven Mcgovern MD OR same dept: Visit date not found OR same specialty: Visit date not found Last physical: 2/19/2020       Next appt within 3 mo: Visit date not found  Next physical within 3 mo: Visit date not found  Prescriber OR PCP: Steven Mcgovern MD  Last diagnosis associated with med order: 1. Mild intermittent asthma with exacerbation  - albuterol (PROAIR HFA;PROVENTIL HFA;VENTOLIN HFA) 90 mcg/actuation inhaler; Inhale 1-2 puffs every 4 (four) hours as needed for wheezing.  Dispense: 1 Inhaler; Refill: 5    2. Benign essential hypertension  - atenoloL (TENORMIN) 50 MG tablet; Take 1 tablet (50 mg total) by mouth daily.  Dispense: 30 tablet; Refill: 5  -  hydroCHLOROthiazide (HYDRODIURIL) 25 MG tablet; Take 1 tablet (25 mg total) by mouth daily.  Dispense: 30 tablet; Refill: 5    3. Anxiety disorder, unspecified type  - buPROPion (WELLBUTRIN XL) 300 MG 24 hr tablet; Take 1 tablet (300 mg total) by mouth daily.  Dispense: 30 tablet; Refill: 5    4. Schizophrenia, unspecified type (H)  - buPROPion (WELLBUTRIN XL) 300 MG 24 hr tablet; Take 1 tablet (300 mg total) by mouth daily.  Dispense: 30 tablet; Refill: 5  - QUEtiapine (SEROQUEL) 50 MG tablet; Take 1 tablet (50 mg total) by mouth at bedtime.  Dispense: 30 tablet; Refill: 11  - risperiDONE (RISPERDAL) 1 MG tablet; Take 1 tablet (1 mg total) by mouth 2 (two) times a day.  Dispense: 60 tablet; Refill: 11    5. Insomnia, unspecified type  - traZODone (DESYREL) 100 MG tablet; Take 1 tablet (100 mg total) by mouth at bedtime.  Dispense: 30 tablet; Refill: 5    If protocol passes may refill for 6 months if within 3 months of last provider visit (or a total of 9 months). If patient requesting >1 inhaler per month refill x 6 months and have patient make appointment with provider.            atenoloL (TENORMIN) 50 MG tablet 30 tablet 5     Sig: Take 1 tablet (50 mg total) by mouth daily.       Beta-Blockers Refill Protocol Passed - 1/27/2021 11:36 AM        Passed - PCP or prescribing provider visit in past 12 months or next 3 months     Last office visit with prescriber/PCP: 9/10/2020 Steven Mcgovern MD OR same dept: Visit date not found OR same specialty: Visit date not found  Last physical: 2/19/2020 Last MTM visit: Visit date not found   Next visit within 3 mo: Visit date not found  Next physical within 3 mo: Visit date not found  Prescriber OR PCP: Steven Mcgovern MD  Last diagnosis associated with med order: 1. Mild intermittent asthma with exacerbation  - albuterol (PROAIR HFA;PROVENTIL HFA;VENTOLIN HFA) 90 mcg/actuation inhaler; Inhale 1-2 puffs every 4 (four) hours as needed for wheezing.  Dispense: 1  Inhaler; Refill: 5    2. Benign essential hypertension  - atenoloL (TENORMIN) 50 MG tablet; Take 1 tablet (50 mg total) by mouth daily.  Dispense: 30 tablet; Refill: 5  - hydroCHLOROthiazide (HYDRODIURIL) 25 MG tablet; Take 1 tablet (25 mg total) by mouth daily.  Dispense: 30 tablet; Refill: 5    3. Anxiety disorder, unspecified type  - buPROPion (WELLBUTRIN XL) 300 MG 24 hr tablet; Take 1 tablet (300 mg total) by mouth daily.  Dispense: 30 tablet; Refill: 5    4. Schizophrenia, unspecified type (H)  - buPROPion (WELLBUTRIN XL) 300 MG 24 hr tablet; Take 1 tablet (300 mg total) by mouth daily.  Dispense: 30 tablet; Refill: 5  - QUEtiapine (SEROQUEL) 50 MG tablet; Take 1 tablet (50 mg total) by mouth at bedtime.  Dispense: 30 tablet; Refill: 11  - risperiDONE (RISPERDAL) 1 MG tablet; Take 1 tablet (1 mg total) by mouth 2 (two) times a day.  Dispense: 60 tablet; Refill: 11    5. Insomnia, unspecified type  - traZODone (DESYREL) 100 MG tablet; Take 1 tablet (100 mg total) by mouth at bedtime.  Dispense: 30 tablet; Refill: 5    If protocol passes may refill for 12 months if within 3 months of last provider visit (or a total of 15 months).             Passed - Blood pressure filed in past 12 months     BP Readings from Last 1 Encounters:   09/10/20 132/78               buPROPion (WELLBUTRIN XL) 300 MG 24 hr tablet 30 tablet 5     Sig: Take 1 tablet (300 mg total) by mouth daily.       Tricyclics/Misc Antidepressant/Antianxiety Meds Refill Protocol Passed - 1/27/2021 11:36 AM        Passed - PCP or prescribing provider visit in last year     Last office visit with prescriber/PCP: 9/10/2020 Steven Mcgovern MD OR same dept: Visit date not found OR same specialty: Visit date not found  Last physical: 2/19/2020 Last MTM visit: Visit date not found   Next visit within 3 mo: Visit date not found  Next physical within 3 mo: Visit date not found  Prescriber OR PCP: Steven Mcgovern MD  Last diagnosis associated with med  order: 1. Mild intermittent asthma with exacerbation  - albuterol (PROAIR HFA;PROVENTIL HFA;VENTOLIN HFA) 90 mcg/actuation inhaler; Inhale 1-2 puffs every 4 (four) hours as needed for wheezing.  Dispense: 1 Inhaler; Refill: 5    2. Benign essential hypertension  - atenoloL (TENORMIN) 50 MG tablet; Take 1 tablet (50 mg total) by mouth daily.  Dispense: 30 tablet; Refill: 5  - hydroCHLOROthiazide (HYDRODIURIL) 25 MG tablet; Take 1 tablet (25 mg total) by mouth daily.  Dispense: 30 tablet; Refill: 5    3. Anxiety disorder, unspecified type  - buPROPion (WELLBUTRIN XL) 300 MG 24 hr tablet; Take 1 tablet (300 mg total) by mouth daily.  Dispense: 30 tablet; Refill: 5    4. Schizophrenia, unspecified type (H)  - buPROPion (WELLBUTRIN XL) 300 MG 24 hr tablet; Take 1 tablet (300 mg total) by mouth daily.  Dispense: 30 tablet; Refill: 5  - QUEtiapine (SEROQUEL) 50 MG tablet; Take 1 tablet (50 mg total) by mouth at bedtime.  Dispense: 30 tablet; Refill: 11  - risperiDONE (RISPERDAL) 1 MG tablet; Take 1 tablet (1 mg total) by mouth 2 (two) times a day.  Dispense: 60 tablet; Refill: 11    5. Insomnia, unspecified type  - traZODone (DESYREL) 100 MG tablet; Take 1 tablet (100 mg total) by mouth at bedtime.  Dispense: 30 tablet; Refill: 5    If protocol passes may refill for 12 months if within 3 months of last provider visit (or a total of 15 months).               hydroCHLOROthiazide (HYDRODIURIL) 25 MG tablet 30 tablet 5     Sig: Take 1 tablet (25 mg total) by mouth daily.       Diuretics/Combination Diuretics Refill Protocol  Passed - 1/27/2021 11:36 AM        Passed - Visit with PCP or prescribing provider visit in past 12 months     Last office visit with prescriber/PCP: 9/10/2020 Steven Mcgovern MD OR same dept: Visit date not found OR same specialty: Visit date not found  Last physical: 2/19/2020 Last MTM visit: Visit date not found   Next visit within 3 mo: Visit date not found  Next physical within 3 mo: Visit date  not found  Prescriber OR PCP: Steven Mcgovern MD  Last diagnosis associated with med order: 1. Mild intermittent asthma with exacerbation  - albuterol (PROAIR HFA;PROVENTIL HFA;VENTOLIN HFA) 90 mcg/actuation inhaler; Inhale 1-2 puffs every 4 (four) hours as needed for wheezing.  Dispense: 1 Inhaler; Refill: 5    2. Benign essential hypertension  - atenoloL (TENORMIN) 50 MG tablet; Take 1 tablet (50 mg total) by mouth daily.  Dispense: 30 tablet; Refill: 5  - hydroCHLOROthiazide (HYDRODIURIL) 25 MG tablet; Take 1 tablet (25 mg total) by mouth daily.  Dispense: 30 tablet; Refill: 5    3. Anxiety disorder, unspecified type  - buPROPion (WELLBUTRIN XL) 300 MG 24 hr tablet; Take 1 tablet (300 mg total) by mouth daily.  Dispense: 30 tablet; Refill: 5    4. Schizophrenia, unspecified type (H)  - buPROPion (WELLBUTRIN XL) 300 MG 24 hr tablet; Take 1 tablet (300 mg total) by mouth daily.  Dispense: 30 tablet; Refill: 5  - QUEtiapine (SEROQUEL) 50 MG tablet; Take 1 tablet (50 mg total) by mouth at bedtime.  Dispense: 30 tablet; Refill: 11  - risperiDONE (RISPERDAL) 1 MG tablet; Take 1 tablet (1 mg total) by mouth 2 (two) times a day.  Dispense: 60 tablet; Refill: 11    5. Insomnia, unspecified type  - traZODone (DESYREL) 100 MG tablet; Take 1 tablet (100 mg total) by mouth at bedtime.  Dispense: 30 tablet; Refill: 5    If protocol passes may refill for 12 months if within 3 months of last provider visit (or a total of 15 months).             Passed - Serum Potassium in past 12 months      Lab Results   Component Value Date    Potassium 3.7 10/26/2020             Passed - Serum Sodium in past 12 months      Lab Results   Component Value Date    Sodium 137 10/26/2020             Passed - Blood pressure on file in past 12 months     BP Readings from Last 1 Encounters:   09/10/20 132/78             Passed - Serum Creatinine in past 12 months      Creatinine   Date Value Ref Range Status   10/26/2020 0.93 0.70 - 1.30 mg/dL  Final               traZODone (DESYREL) 100 MG tablet 30 tablet 5     Sig: Take 1 tablet (100 mg total) by mouth at bedtime.       Tricyclics/Misc Antidepressant/Antianxiety Meds Refill Protocol Passed - 1/27/2021 11:36 AM        Passed - PCP or prescribing provider visit in last year     Last office visit with prescriber/PCP: 9/10/2020 Steven Mcgovern MD OR same dept: Visit date not found OR same specialty: Visit date not found  Last physical: 2/19/2020 Last MTM visit: Visit date not found   Next visit within 3 mo: Visit date not found  Next physical within 3 mo: Visit date not found  Prescriber OR PCP: Steven Mcgovern MD  Last diagnosis associated with med order: 1. Mild intermittent asthma with exacerbation  - albuterol (PROAIR HFA;PROVENTIL HFA;VENTOLIN HFA) 90 mcg/actuation inhaler; Inhale 1-2 puffs every 4 (four) hours as needed for wheezing.  Dispense: 1 Inhaler; Refill: 5    2. Benign essential hypertension  - atenoloL (TENORMIN) 50 MG tablet; Take 1 tablet (50 mg total) by mouth daily.  Dispense: 30 tablet; Refill: 5  - hydroCHLOROthiazide (HYDRODIURIL) 25 MG tablet; Take 1 tablet (25 mg total) by mouth daily.  Dispense: 30 tablet; Refill: 5    3. Anxiety disorder, unspecified type  - buPROPion (WELLBUTRIN XL) 300 MG 24 hr tablet; Take 1 tablet (300 mg total) by mouth daily.  Dispense: 30 tablet; Refill: 5    4. Schizophrenia, unspecified type (H)  - buPROPion (WELLBUTRIN XL) 300 MG 24 hr tablet; Take 1 tablet (300 mg total) by mouth daily.  Dispense: 30 tablet; Refill: 5  - QUEtiapine (SEROQUEL) 50 MG tablet; Take 1 tablet (50 mg total) by mouth at bedtime.  Dispense: 30 tablet; Refill: 11  - risperiDONE (RISPERDAL) 1 MG tablet; Take 1 tablet (1 mg total) by mouth 2 (two) times a day.  Dispense: 60 tablet; Refill: 11    5. Insomnia, unspecified type  - traZODone (DESYREL) 100 MG tablet; Take 1 tablet (100 mg total) by mouth at bedtime.  Dispense: 30 tablet; Refill: 5    If protocol passes may  refill for 12 months if within 3 months of last provider visit (or a total of 15 months).

## 2021-06-14 NOTE — TELEPHONE ENCOUNTER
Okay for albuterol refill and he should be scheduled for a follow-up appointment, please check with Jeniffer, should double book rather than use an open spot

## 2021-06-14 NOTE — TELEPHONE ENCOUNTER
Valente requests for his medications to be transferred from [facility] pharmacy to [facility] pharmacy [place].    [facility] called [facility] at [phone] and advised pharmacist to [facility] at [phone] to request for transfer of all 4 prescriptions dated 1/27 (hydrochlorothiazide, bupropion., albuterol, atenolol). [facility] pharmacist verbalized agreement.    [facility] phoned Valente back, advised to call [facility] for further updates. He verbalized understanding.    Helene Rodríguez RN/[place] Nurse Advisor      Reason for Disposition    Caller has medication question only, adult not sick, and triager answers question    Protocols used: MEDICATION QUESTION CALL-A-OH

## 2021-06-14 NOTE — TELEPHONE ENCOUNTER
Reason for Call:  Medication or medication refill:    Do you use a [facility]?  Name of the pharmacy and phone number for the current request: [facility] [address] [facility] [phone]    Name of the medication requested:   - out of albuterol (PROAIR HFA;PROVENTIL HFA;VENTOLIN HFA) 90 mcg/actuation inhaler  -out of atenolol (TENORMIN) 50 MG tablet    Requesting all his medication sent to this pharmacy but will need theses 2 asap as he is out of them.    He called [facility] but they didn't have the albuterol and it is to far.   Other request: no    Can we leave a detailed message on this number? Yes    Phone number patient can be reached at: Home number on file [phone] (home)    Best Time: anytime    Call taken on 1/27/2021 at 11:15 AM by Jessica Lundberg

## 2021-06-16 PROBLEM — S82.141A CLOSED FRACTURE OF RIGHT TIBIAL PLATEAU, INITIAL ENCOUNTER: Status: ACTIVE | Noted: 2020-08-08

## 2021-06-16 PROBLEM — J45.21 MILD INTERMITTENT ASTHMA WITH EXACERBATION: Status: ACTIVE | Noted: 2019-09-04

## 2021-06-16 PROBLEM — Z72.0 TOBACCO ABUSE: Status: ACTIVE | Noted: 2019-09-04

## 2021-06-16 PROBLEM — M54.50 BILATERAL LOW BACK PAIN WITHOUT SCIATICA: Status: ACTIVE | Noted: 2019-09-04

## 2021-06-16 PROBLEM — S06.9XAA TBI (TRAUMATIC BRAIN INJURY) (H): Status: ACTIVE | Noted: 2019-09-04

## 2021-06-16 PROBLEM — I10 BENIGN ESSENTIAL HYPERTENSION: Status: ACTIVE | Noted: 2019-09-04

## 2021-06-16 PROBLEM — T79.A21A: Status: ACTIVE | Noted: 2020-08-08

## 2021-06-19 NOTE — LETTER
Letter by Walt Rios MD at      Author: Walt Rios MD Service: -- Author Type: --    Filed:  Encounter Date: 9/27/2019 Status: Signed         Valente Pope Jr.  [address] [place], [place] [id number]             September 27, 2019         Dear Mr. Pope,    Below are the results from your recent visit:    Resulted Orders   Comprehensive Metabolic Panel   Result Value Ref Range    Sodium 140 136 - 145 mmol/L    Potassium 3.8 3.5 - 5.0 mmol/L    Chloride 103 98 - 107 mmol/L    CO2 27 22 - 31 mmol/L    Anion Gap, Calculation 10 5 - 18 mmol/L    Glucose 98 70 - 125 mg/dL    BUN 8 8 - 22 mg/dL    Creatinine 1.11 0.70 - 1.30 mg/dL    GFR MDRD Af Amer >60 >60 mL/min/1.73m2    GFR MDRD Non Af Amer >60 >60 mL/min/1.73m2    Bilirubin, Total 0.4 0.0 - 1.0 mg/dL    Calcium 9.3 8.5 - 10.5 mg/dL    Protein, Total 7.1 6.0 - 8.0 g/dL    Albumin 3.9 3.5 - 5.0 g/dL    Alkaline Phosphatase 67 45 - 120 U/L    AST 17 0 - 40 U/L    ALT 24 0 - 45 U/L    Narrative    Fasting Glucose reference range is 70-99 mg/dL per  American Diabetes Association (ADA) guidelines.   Lipid Cascade   Result Value Ref Range    Cholesterol 200 (H) <=199 mg/dL    Triglycerides 92 <=149 mg/dL    HDL Cholesterol 71 >=40 mg/dL    LDL Calculated 111 <=129 mg/dL    Patient Fasting > 8hrs? Yes    HIV Antigen/Antibody Screening Cascade   Result Value Ref Range    HIV Antigen / Antibody Negative Negative    Narrative    Method is Abbott HIV Ag/Ab for the detection of HIV p24 antigen, HIV-1 antibodies and HIV-2 antibodies.   Thyroid Branch   Result Value Ref Range    TSH 0.37 0.30 - 5.00 uIU/mL   Hepatitis C Antibody (Anti-HCV)   Result Value Ref Range    Hepatitis C Ab Negative Negative   HM1 (CBC with Diff)   Result Value Ref Range    WBC 6.4 4.0 - 11.0 thou/uL    RBC 4.59 4.40 - 6.20 mill/uL    Hemoglobin 15.1 14.0 - 18.0 g/dL    Hematocrit 46.4 40.0 - 54.0 %     (H) 80 - 100 fL    MCH 32.9 27.0 - 34.0 pg    MCHC 32.5 32.0 - 36.0  "g/dL    RDW 13.7 11.0 - 14.5 %    Platelets 152 140 - 440 thou/uL    MPV 12.6 (H) 8.5 - 12.5 fL    Neutrophils % 71 (H) 50 - 70 %    Lymphocytes % 19 (L) 20 - 40 %    Monocytes % 7 2 - 10 %    Eosinophils % 2 0 - 6 %    Basophils % 1 0 - 2 %    Neutrophils Absolute 4.5 2.0 - 7.7 thou/uL    Lymphocytes Absolute 1.2 0.8 - 4.4 thou/uL    Monocytes Absolute 0.5 0.0 - 0.9 thou/uL    Eosinophils Absolute 0.1 0.0 - 0.4 thou/uL    Basophils Absolute 0.0 0.0 - 0.2 thou/uL        Enclosed are your test results from your recent clinic visit with me.  Everything looks quite good.   Metabolic panel, which looks at kidney and liver function, was completely normal.  Lipid profile looks  good with a generous level of HDL (good cholesterol).  Tests for HIV and hepatitis C virus were both  negative.  Blood cell counts I think look good.  There was slight variance in the size of the red cells and  the proportion of the different types of white blood cells, but these were barely outside the normal  range, and probably represent normal variation (we could say \"normal for you\").\"    Please call with questions or contact us using [employer].    Sincerely,        Electronically signed by Walt Rios MD       "

## 2021-06-20 NOTE — LETTER
Letter by Steven Mcgovern MD at      Author: Steven Mcgovern MD Service: -- Author Type: --    Filed:  Encounter Date: 2/20/2020 Status: (Other)         Valente Pope Jr.  [address] [facility] [address]             February 20, 2020         Dear  Pope,    Below are the results from your recent visit:    Resulted Orders   HIV Antigen/Antibody Screening Cascade   Result Value Ref Range    HIV Antigen / Antibody Negative Negative    Narrative    Method is Abbott HIV Ag/Ab for the detection of HIV p24 antigen, HIV-1 antibodies and HIV-2 antibodies.   Chlamydia trachomatis & Neisseria gonorrhoeae, Amplified Detection   Result Value Ref Range    Chlamydia trachomatis, Amplified Detection Negative Negative    Neisseria gonorrhoeae, Amplified Detection Negative Negative       Labs okay, excellent    Please call with questions or contact us using [employer]    Sincerely,        Electronically signed by Steven Mcgovern MD

## 2021-06-25 NOTE — TELEPHONE ENCOUNTER
Reason for Call:  Medication or medication refill:  albuterol (PROAIR HFA;PROVENTIL HFA;VENTOLIN HFA) 90 mcg/actuation inhaler    atenoloL (TENORMIN) 50 MG tablet    buPROPion (WELLBUTRIN XL) 300 MG 24 hr tablet    hydroCHLOROthiazide (HYDRODIURIL) 25 MG tablet    QUEtiapine (SEROQUEL) 50 MG tablet    risperiDONE (RISPERDAL) 1 MG tablet    traZODone (DESYREL) 100 MG tablet    Other medications he would like filled but does not show Dr. Mcgovern as prescribing.  cyclobenzaprine (FLEXERIL) 10 MG tablet  gabapentin (NEURONTIN) 300 MG capsule  hydrOXYzine HCL (ATARAX) 25 MG tablet    Do you use a [facility]?  Name of the pharmacy and phone number for the current request: [facility], [place], [place] (Pt is out of town)    Name of the medication requested: albuterol (PROAIR HFA;PROVENTIL HFA;VENTOLIN HFA) 90 mcg/actuation inhaler    atenoloL (TENORMIN) 50 MG tablet    buPROPion (WELLBUTRIN XL) 300 MG 24 hr tablet    hydroCHLOROthiazide (HYDRODIURIL) 25 MG tablet    QUEtiapine (SEROQUEL) 50 MG tablet    risperiDONE (RISPERDAL) 1 MG tablet    traZODone (DESYREL) 100 MG tablet    Other medications he would like filled but does not show Dr. Mcgovern as prescribing.  cyclobenzaprine (FLEXERIL) 10 MG tablet  gabapentin (NEURONTIN) 300 MG capsule  hydrOXYzine HCL (ATARAX) 25 MG tablet    Other request: Pt is in [place], [place] until September / October and needs medications send to [facility] in [place], [place]. Please let patient know when refills are     Can we leave a detailed message on this number? Yes    Phone number patient can be reached at: Home number on file [phone] (home)    Best Time:     Call taken on 6/8/2021 at 3:00 PM by Eloisa Mariscal

## 2021-06-25 NOTE — TELEPHONE ENCOUNTER
I set them all up for you for 90 days and 1 refill    Cecile Medellin CMA ([facility])

## 2021-07-03 NOTE — ADDENDUM NOTE
Addendum Note by Paulo Isabel LPN at 2/1/2021  4:04 PM     Author: Paulo Isabel LPN Service: -- Author Type: Licensed Nurse    Filed: 2/1/2021  4:04 PM Encounter Date: 2/1/2021 Status: Signed    : Paulo Isabel LPN (Licensed Nurse)    Addended by: PAULO ISABEL on: 2/1/2021 04:04 PM        Modules accepted: Orders

## 2021-07-04 NOTE — ADDENDUM NOTE
Addendum Note by Steven Pollack MD at 2/1/2021  4:13 PM     Author: Steven Pollack MD Service: -- Author Type: Physician    Filed: 2/1/2021  4:13 PM Encounter Date: 2/1/2021 Status: Signed    : Steven Pollack MD (Physician)    Addended by: STEVEN POLLACK on: 2/1/2021 04:13 PM        Modules accepted: Orders

## 2021-07-14 PROBLEM — J45.41 MODERATE PERSISTENT ASTHMA WITH EXACERBATION: Status: RESOLVED | Noted: 2019-09-25 | Resolved: 2020-02-19

## 2022-02-04 DIAGNOSIS — F41.9 ANXIETY DISORDER, UNSPECIFIED TYPE: ICD-10-CM

## 2022-02-04 DIAGNOSIS — G89.29 CHRONIC BILATERAL LOW BACK PAIN WITHOUT SCIATICA: ICD-10-CM

## 2022-02-04 DIAGNOSIS — G47.00 INSOMNIA, UNSPECIFIED TYPE: ICD-10-CM

## 2022-02-04 DIAGNOSIS — F20.9 SCHIZOPHRENIA, UNSPECIFIED TYPE (H): ICD-10-CM

## 2022-02-04 DIAGNOSIS — M54.50 CHRONIC BILATERAL LOW BACK PAIN WITHOUT SCIATICA: ICD-10-CM

## 2022-02-04 DIAGNOSIS — I10 BENIGN ESSENTIAL HYPERTENSION: ICD-10-CM

## 2022-02-04 DIAGNOSIS — J45.21 MILD INTERMITTENT ASTHMA WITH EXACERBATION: ICD-10-CM

## 2022-02-04 NOTE — TELEPHONE ENCOUNTER
Reason for Call:  Medication or medication refill:    Do you use a [facility]?  Name of the pharmacy and phone number for the current request:    [facility] [address]    Name of the medication requested:   All meds need refill    Other request: n/a    Can we leave a detailed message on this number? YES    Phone number patient can be reached at: Other phone number:  [phone]    Best Time: anytime    Call taken on 2/4/2022 at 10:56 AM by Eden Haile

## 2022-02-07 RX ORDER — GABAPENTIN 300 MG/1
300 CAPSULE ORAL DAILY
Qty: 90 CAPSULE | Refills: 1 | Status: SHIPPED | OUTPATIENT
Start: 2022-02-07 | End: 2022-08-17

## 2022-02-07 RX ORDER — HYDROCHLOROTHIAZIDE 25 MG/1
25 TABLET ORAL DAILY
Qty: 90 TABLET | Refills: 1 | Status: SHIPPED | OUTPATIENT
Start: 2022-02-07 | End: 2022-08-17

## 2022-02-07 RX ORDER — HYDROXYZINE HYDROCHLORIDE 25 MG/1
25-50 TABLET, FILM COATED ORAL EVERY 8 HOURS PRN
Qty: 90 TABLET | Refills: 1 | Status: SHIPPED | OUTPATIENT
Start: 2022-02-07 | End: 2022-08-17

## 2022-02-07 RX ORDER — QUETIAPINE FUMARATE 50 MG/1
50 TABLET, FILM COATED ORAL AT BEDTIME
Qty: 90 TABLET | Refills: 1 | Status: SHIPPED | OUTPATIENT
Start: 2022-02-07 | End: 2022-08-17

## 2022-02-07 RX ORDER — ATENOLOL 50 MG/1
50 TABLET ORAL DAILY
Qty: 90 TABLET | Refills: 1 | Status: SHIPPED | OUTPATIENT
Start: 2022-02-07 | End: 2022-08-17

## 2022-02-07 RX ORDER — ALBUTEROL SULFATE 90 UG/1
1-2 AEROSOL, METERED RESPIRATORY (INHALATION) EVERY 4 HOURS PRN
Qty: 8.5 G | Refills: 1 | Status: SHIPPED | OUTPATIENT
Start: 2022-02-07 | End: 2022-08-17

## 2022-02-07 RX ORDER — TRAZODONE HYDROCHLORIDE 100 MG/1
100 TABLET ORAL AT BEDTIME
Qty: 90 TABLET | Refills: 1 | Status: SHIPPED | OUTPATIENT
Start: 2022-02-07 | End: 2022-08-17

## 2022-02-07 RX ORDER — RISPERIDONE 1 MG/1
1 TABLET ORAL 2 TIMES DAILY
Qty: 180 TABLET | Refills: 1 | Status: SHIPPED | OUTPATIENT
Start: 2022-02-07 | End: 2022-08-17

## 2022-02-07 RX ORDER — BUPROPION HYDROCHLORIDE 300 MG/1
300 TABLET ORAL DAILY
Qty: 90 TABLET | Refills: 1 | Status: SHIPPED | OUTPATIENT
Start: 2022-02-07 | End: 2022-08-17

## 2022-02-07 NOTE — TELEPHONE ENCOUNTER
Pt requesting all meds to be refilled at this time.    Last seen 9/10/20  Last med refill 6/9/21    Rx pended if appropriate.

## 2022-05-27 ENCOUNTER — TELEPHONE (OUTPATIENT)
Dept: INTERNAL MEDICINE | Facility: CLINIC | Age: 42
End: 2022-05-27
Payer: COMMERCIAL

## 2022-05-27 NOTE — TELEPHONE ENCOUNTER
Reason for Call:  Medication or medication refill:    Do you use a [facility]?  Name of the pharmacy and phone number for the current request:    [facility]  Phone:  [phone]    Name of the medication requested:   Pt needing all meds refilled     Other request: Pt still out of town    Can we leave a detailed message on this number? YES    Phone number patient can be reached at: Other phone number:  [phone]    Best Time: anytime    Call taken on 5/27/2022 at 10:59 AM by Eden Haile

## 2022-06-01 NOTE — TELEPHONE ENCOUNTER
PT last seen September 2020.  Needs appt prior to med refills. Spoke with pt and relayed this.  Pt understanding.

## 2022-06-01 NOTE — TELEPHONE ENCOUNTER
Pt called back and we did schedule an appt and also he would like to talk to Dr Mcgovern about the refill and also paperwork that was faxed.

## 2022-06-02 ENCOUNTER — APPOINTMENT (OUTPATIENT)
Dept: INTERNAL MEDICINE | Facility: CLINIC | Age: 42
End: 2022-06-02
Payer: COMMERCIAL

## 2022-08-05 ENCOUNTER — VIRTUAL VISIT (OUTPATIENT)
Dept: INTERNAL MEDICINE | Facility: CLINIC | Age: 42
End: 2022-08-05
Payer: COMMERCIAL

## 2022-08-05 DIAGNOSIS — F33.41 RECURRENT MAJOR DEPRESSIVE DISORDER, IN PARTIAL REMISSION (H): Primary | ICD-10-CM

## 2022-08-05 PROCEDURE — 99207 PR NO CHARGE LOS: CPT | Performed by: INTERNAL MEDICINE

## 2022-08-16 DIAGNOSIS — G89.29 CHRONIC BILATERAL LOW BACK PAIN WITHOUT SCIATICA: ICD-10-CM

## 2022-08-16 DIAGNOSIS — F41.9 ANXIETY DISORDER, UNSPECIFIED TYPE: ICD-10-CM

## 2022-08-16 DIAGNOSIS — J45.21 MILD INTERMITTENT ASTHMA WITH EXACERBATION: ICD-10-CM

## 2022-08-16 DIAGNOSIS — I10 BENIGN ESSENTIAL HYPERTENSION: ICD-10-CM

## 2022-08-16 DIAGNOSIS — M54.50 CHRONIC BILATERAL LOW BACK PAIN WITHOUT SCIATICA: ICD-10-CM

## 2022-08-16 DIAGNOSIS — G47.00 INSOMNIA, UNSPECIFIED TYPE: ICD-10-CM

## 2022-08-16 DIAGNOSIS — F20.9 SCHIZOPHRENIA, UNSPECIFIED TYPE (H): ICD-10-CM

## 2022-08-16 NOTE — TELEPHONE ENCOUNTER
Medication Question or Refill        What medication are you calling about (include dose and sig)?: All medications on file     Controlled Substance Agreement on file:   CSA -- Patient Level:    CSA: None found at the patient level.         Do you need a refill? Yes: Patient is going to be out of all medication.       Patient offered an appointment?  Yes, patient will be having virtual visit with Dr. Whartno tomorrow at 5PM as Dr. Mcgovern does not have anything sooner. Patient stated he will be home tomorrow from [place].       Do you have any questions or concerns?  No    Preferred Pharmacy:   [id number] - [place], [place] - [address] [id number] - [place] - [address]     Okay to leave a detailed message?: Yes at Home number on file [phone] (home)

## 2022-08-17 ENCOUNTER — VIRTUAL VISIT (OUTPATIENT)
Dept: INTERNAL MEDICINE | Facility: CLINIC | Age: 42
End: 2022-08-17
Payer: COMMERCIAL

## 2022-08-17 DIAGNOSIS — Z87.820 PERSONAL HISTORY OF TRAUMATIC BRAIN INJURY: ICD-10-CM

## 2022-08-17 DIAGNOSIS — F20.9 SCHIZOPHRENIA, UNSPECIFIED TYPE (H): ICD-10-CM

## 2022-08-17 DIAGNOSIS — F10.21 RECOVERING ALCOHOLIC IN REMISSION (H): ICD-10-CM

## 2022-08-17 DIAGNOSIS — Z00.00 PREVENTATIVE HEALTH CARE: ICD-10-CM

## 2022-08-17 DIAGNOSIS — I10 BENIGN ESSENTIAL HYPERTENSION: ICD-10-CM

## 2022-08-17 DIAGNOSIS — G89.29 CHRONIC BILATERAL LOW BACK PAIN WITHOUT SCIATICA: ICD-10-CM

## 2022-08-17 DIAGNOSIS — F51.01 PRIMARY INSOMNIA: ICD-10-CM

## 2022-08-17 DIAGNOSIS — J45.21 MILD INTERMITTENT ASTHMA WITH EXACERBATION: ICD-10-CM

## 2022-08-17 DIAGNOSIS — F43.10 POSTTRAUMATIC STRESS DISORDER: ICD-10-CM

## 2022-08-17 DIAGNOSIS — M54.50 CHRONIC BILATERAL LOW BACK PAIN WITHOUT SCIATICA: ICD-10-CM

## 2022-08-17 DIAGNOSIS — F33.41 RECURRENT MAJOR DEPRESSIVE DISORDER, IN PARTIAL REMISSION (H): Primary | ICD-10-CM

## 2022-08-17 PROBLEM — F10.20 SEVERE ALCOHOL USE DISORDER (H): Status: ACTIVE | Noted: 2022-08-17

## 2022-08-17 PROBLEM — S82.141A CLOSED FRACTURE OF RIGHT TIBIAL PLATEAU, INITIAL ENCOUNTER: Status: RESOLVED | Noted: 2020-08-08 | Resolved: 2022-08-17

## 2022-08-17 PROBLEM — F10.20 SEVERE ALCOHOL USE DISORDER (H): Status: RESOLVED | Noted: 2022-08-17 | Resolved: 2022-08-17

## 2022-08-17 PROBLEM — T79.A21A: Status: RESOLVED | Noted: 2020-08-08 | Resolved: 2022-08-17

## 2022-08-17 PROCEDURE — 99214 OFFICE O/P EST MOD 30 MIN: CPT | Mod: GT | Performed by: INTERNAL MEDICINE

## 2022-08-17 RX ORDER — CYCLOBENZAPRINE HCL 10 MG
10 TABLET ORAL 2 TIMES DAILY PRN
Qty: 90 TABLET | Refills: 3 | Status: SHIPPED | OUTPATIENT
Start: 2022-08-17 | End: 2023-09-27

## 2022-08-17 RX ORDER — FLUTICASONE PROPIONATE 44 UG/1
2 AEROSOL, METERED RESPIRATORY (INHALATION) 2 TIMES DAILY
Qty: 10.6 G | Refills: 11 | Status: SHIPPED | OUTPATIENT
Start: 2022-08-17

## 2022-08-17 RX ORDER — GABAPENTIN 300 MG/1
300 CAPSULE ORAL DAILY
Qty: 90 CAPSULE | Refills: 1 | Status: SHIPPED | OUTPATIENT
Start: 2022-08-17 | End: 2022-08-17

## 2022-08-17 RX ORDER — HYDROXYZINE HYDROCHLORIDE 25 MG/1
25-50 TABLET, FILM COATED ORAL EVERY 8 HOURS PRN
Qty: 90 TABLET | Refills: 3 | Status: SHIPPED | OUTPATIENT
Start: 2022-08-17 | End: 2023-09-27

## 2022-08-17 RX ORDER — IBUPROFEN 800 MG/1
800 TABLET, FILM COATED ORAL EVERY 8 HOURS PRN
Qty: 60 TABLET | Refills: 2 | Status: SHIPPED | OUTPATIENT
Start: 2022-08-17 | End: 2023-09-27

## 2022-08-17 RX ORDER — ATENOLOL 50 MG/1
50 TABLET ORAL DAILY
Qty: 90 TABLET | Refills: 1 | Status: SHIPPED | OUTPATIENT
Start: 2022-08-17 | End: 2022-08-17

## 2022-08-17 RX ORDER — HYDROCHLOROTHIAZIDE 25 MG/1
25 TABLET ORAL DAILY
Qty: 90 TABLET | Refills: 3 | Status: SHIPPED | OUTPATIENT
Start: 2022-08-17 | End: 2023-09-27

## 2022-08-17 RX ORDER — TRAZODONE HYDROCHLORIDE 100 MG/1
100 TABLET ORAL AT BEDTIME
Qty: 90 TABLET | Refills: 3 | Status: SHIPPED | OUTPATIENT
Start: 2022-08-17 | End: 2023-09-27

## 2022-08-17 RX ORDER — QUETIAPINE FUMARATE 50 MG/1
50 TABLET, FILM COATED ORAL AT BEDTIME
Qty: 90 TABLET | Refills: 3 | Status: SHIPPED | OUTPATIENT
Start: 2022-08-17 | End: 2023-09-27

## 2022-08-17 RX ORDER — RISPERIDONE 1 MG/1
1 TABLET ORAL 2 TIMES DAILY
Qty: 180 TABLET | Refills: 1 | Status: SHIPPED | OUTPATIENT
Start: 2022-08-17 | End: 2022-08-17

## 2022-08-17 RX ORDER — HYDROCHLOROTHIAZIDE 25 MG/1
25 TABLET ORAL DAILY
Qty: 90 TABLET | Refills: 1 | Status: SHIPPED | OUTPATIENT
Start: 2022-08-17 | End: 2022-08-17

## 2022-08-17 RX ORDER — BUPROPION HYDROCHLORIDE 300 MG/1
300 TABLET ORAL DAILY
Qty: 90 TABLET | Refills: 3 | Status: SHIPPED | OUTPATIENT
Start: 2022-08-17 | End: 2023-09-27

## 2022-08-17 RX ORDER — ALBUTEROL SULFATE 90 UG/1
1-2 AEROSOL, METERED RESPIRATORY (INHALATION) EVERY 4 HOURS PRN
Qty: 8.5 G | Refills: 1 | Status: SHIPPED | OUTPATIENT
Start: 2022-08-17 | End: 2022-08-17

## 2022-08-17 RX ORDER — BUPROPION HYDROCHLORIDE 300 MG/1
300 TABLET ORAL DAILY
Qty: 90 TABLET | Refills: 1 | Status: SHIPPED | OUTPATIENT
Start: 2022-08-17 | End: 2022-08-17

## 2022-08-17 RX ORDER — HYDROXYZINE HYDROCHLORIDE 25 MG/1
25-50 TABLET, FILM COATED ORAL EVERY 8 HOURS PRN
Qty: 90 TABLET | Refills: 1 | Status: SHIPPED | OUTPATIENT
Start: 2022-08-17 | End: 2022-08-17

## 2022-08-17 RX ORDER — QUETIAPINE FUMARATE 50 MG/1
50 TABLET, FILM COATED ORAL AT BEDTIME
Qty: 90 TABLET | Refills: 1 | Status: SHIPPED | OUTPATIENT
Start: 2022-08-17 | End: 2022-08-17

## 2022-08-17 RX ORDER — ATENOLOL 50 MG/1
50 TABLET ORAL DAILY
Qty: 90 TABLET | Refills: 3 | Status: SHIPPED | OUTPATIENT
Start: 2022-08-17 | End: 2023-09-27

## 2022-08-17 RX ORDER — TRAZODONE HYDROCHLORIDE 100 MG/1
100 TABLET ORAL AT BEDTIME
Qty: 90 TABLET | Refills: 1 | Status: SHIPPED | OUTPATIENT
Start: 2022-08-17 | End: 2022-08-17

## 2022-08-17 RX ORDER — GABAPENTIN 300 MG/1
300 CAPSULE ORAL 3 TIMES DAILY
Qty: 270 CAPSULE | Refills: 3 | Status: SHIPPED | OUTPATIENT
Start: 2022-08-17 | End: 2023-02-15

## 2022-08-17 RX ORDER — ALBUTEROL SULFATE 90 UG/1
1-2 AEROSOL, METERED RESPIRATORY (INHALATION) EVERY 6 HOURS PRN
Qty: 8.5 G | Refills: 1 | Status: SHIPPED | OUTPATIENT
Start: 2022-08-17 | End: 2023-09-27

## 2022-08-17 RX ORDER — RISPERIDONE 1 MG/1
1 TABLET ORAL 2 TIMES DAILY
Qty: 180 TABLET | Refills: 3 | Status: SHIPPED | OUTPATIENT
Start: 2022-08-17 | End: 2023-09-27

## 2022-08-17 NOTE — PROGRESS NOTES
Valente is a 41 year old male contacting the clinic today via video, who will use the platform: [employer] for the visit.  Phone # for [employer], or if Amwell drops:   Telephone Information:   Mobile [phone]          ASSESSMENT and PLAN:  1. Recurrent major depressive disorder, in partial remission (H)  Refill meds.  Update labs  - QUEtiapine (SEROQUEL) 50 MG tablet; Take 1 tablet (50 mg) by mouth At Bedtime  Dispense: 90 tablet; Refill: 3  - risperiDONE (RISPERDAL) 1 MG tablet; Take 1 tablet (1 mg) by mouth 2 times daily  Dispense: 180 tablet; Refill: 3  - traZODone (DESYREL) 100 MG tablet; Take 1 tablet (100 mg) by mouth At Bedtime  Dispense: 90 tablet; Refill: 3  - Vitamin D Deficiency; Future  - Vitamin B12; Future  - Comprehensive metabolic panel; Future  - CBC with Platelets & Differential; Future  - Erythrocyte sedimentation rate auto; Future  - Ferritin; Future  - CRP inflammation; Future  - Lipid Profile; Future  - TSH; Future    2. Mild intermittent asthma with exacerbation  Refill meds  - albuterol (PROAIR HFA/PROVENTIL HFA/VENTOLIN HFA) 108 (90 Base) MCG/ACT inhaler; Inhale 1-2 puffs into the lungs every 6 hours as needed for shortness of breath / dyspnea or wheezing  Dispense: 8.5 g; Refill: 1  - fluticasone (FLOVENT HFA) 44 MCG/ACT inhaler; Inhale 2 puffs into the lungs 2 times daily  Dispense: 10.6 g; Refill: 11    3. Schizophrenia, unspecified type (H)  Refill meds  - buPROPion (WELLBUTRIN XL) 300 MG 24 hr tablet; Take 1 tablet (300 mg) by mouth daily  Dispense: 90 tablet; Refill: 3  - QUEtiapine (SEROQUEL) 50 MG tablet; Take 1 tablet (50 mg) by mouth At Bedtime  Dispense: 90 tablet; Refill: 3  - risperiDONE (RISPERDAL) 1 MG tablet; Take 1 tablet (1 mg) by mouth 2 times daily  Dispense: 180 tablet; Refill: 3    4. Personal history of traumatic brain injury  Several years ago.  Chart corrected    5. Posttraumatic stress disorder  No nightmares    6. Recovering alcoholic in remission (H)  No alcohol  since 2019.  Chart corrected    7. Benign essential hypertension  - atenolol (TENORMIN) 50 MG tablet; Take 1 tablet (50 mg) by mouth daily  Dispense: 90 tablet; Refill: 3  - hydrochlorothiazide (HYDRODIURIL) 25 MG tablet; Take 1 tablet (25 mg) by mouth daily  Dispense: 90 tablet; Refill: 3  - aspirin (ASA) 81 MG EC tablet; Take 2 tablets (162 mg) by mouth daily  Dispense: 180 tablet; Refill: 3  - Vitamin D Deficiency; Future  - Vitamin B12; Future  - Comprehensive metabolic panel; Future  - CBC with Platelets & Differential; Future  - Erythrocyte sedimentation rate auto; Future  - Ferritin; Future  - CRP inflammation; Future  - Lipid Profile; Future  - TSH; Future    8. Chronic bilateral low back pain without sciatica  Check a few labs for chronic pain  - cyclobenzaprine (FLEXERIL) 10 MG tablet; Take 1 tablet (10 mg) by mouth 2 times daily as needed for muscle spasms  Dispense: 90 tablet; Refill: 3  - gabapentin (NEURONTIN) 300 MG capsule; Take 1 capsule (300 mg) by mouth 3 times daily  Dispense: 270 capsule; Refill: 3  - hydrOXYzine (ATARAX) 25 MG tablet; Take 1-2 tablets (25-50 mg) by mouth every 8 hours as needed for anxiety  Dispense: 90 tablet; Refill: 3  - ibuprofen (ADVIL/MOTRIN) 800 MG tablet; Take 1 tablet (800 mg) by mouth every 8 hours as needed for moderate pain  Dispense: 60 tablet; Refill: 2  - Uric acid; Future    9. Primary insomnia  - QUEtiapine (SEROQUEL) 50 MG tablet; Take 1 tablet (50 mg) by mouth At Bedtime  Dispense: 90 tablet; Refill: 3  - traZODone (DESYREL) 100 MG tablet; Take 1 tablet (100 mg) by mouth At Bedtime  Dispense: 90 tablet; Refill: 3    10. Preventative health care  - REVIEW OF HEALTH MAINTENANCE PROTOCOL ORDERS  - Pneumococcal 20 Valent Conjugate (Prevnar 20); Future    Patient Instructions   Problem list cleared up    Medication list cleared up and refilled 90-day supplies for 1 year    Update labs and vaccines    Consider evaluation for chronic pain    Labs to monitor  "medicines and chronic pain including uric acid, thyroid and B12    Complete paperwork through staff            Return in about 1 year (around 8/17/2023) for using a video visit.       CHIEF COMPLAINT:  Chief Complaint   Patient presents with     Follow Up     Forms       HISTORY OF PRESENT ILLNESS:  Valente is a 41 year old male contacting the clinic today via video for refill of medicines.  He ran out of several of his medicines over the last 3 to 4 weeks.  Medication list carefully reviewed.  Refilled for 90-day supplies and refills for 1 year    Problem list reviewed.  Clarify that he had a traumatic brain injury 20 years ago.  He broke his leg and had compartment syndrome 3 years ago.  He was anemic at that time.  He reports quitting drinking in 2019 but does still smoke cigarettes.  He has residual pain in his leg possibly neuropathic for which he takes gabapentin and ibuprofen    Mental health problems include PTSD, schizophrenia, anxiety and depression    REVIEW OF SYSTEMS:   Mild asthma well-controlled    PFSH:  Social History     Social History Narrative    Lives with his Auntie.  Not working.       TOBACCO USE:  History   Smoking Status     Current Every Day Smoker     Types: Cigarettes   Smokeless Tobacco     Never Used       VITALS:  There were no vitals filed for this visit.  There were no vitals taken for this visit. Estimated body mass index is 36.22 kg/m  as calculated from the following:    Height as of 9/10/20: 1.886 m (6' 2.25\").    Weight as of 9/10/20: 128.8 kg (284 lb).    PHYSICAL EXAM:  (observations via Video)  Alert.  Seems tired.  Video connection spotty.  Wearing ballcap    MEDICATIONS:   Current Outpatient Medications   Medication Sig Dispense Refill     albuterol (PROAIR HFA/PROVENTIL HFA/VENTOLIN HFA) 108 (90 Base) MCG/ACT inhaler Inhale 1-2 puffs into the lungs every 6 hours as needed for shortness of breath / dyspnea or wheezing 8.5 g 1     aspirin (ASA) 81 MG EC tablet Take 2 " tablets (162 mg) by mouth daily 180 tablet 3     atenolol (TENORMIN) 50 MG tablet Take 1 tablet (50 mg) by mouth daily 90 tablet 3     buPROPion (WELLBUTRIN XL) 300 MG 24 hr tablet Take 1 tablet (300 mg) by mouth daily 90 tablet 3     cyclobenzaprine (FLEXERIL) 10 MG tablet Take 1 tablet (10 mg) by mouth 2 times daily as needed for muscle spasms 90 tablet 3     fluticasone (FLOVENT HFA) 44 MCG/ACT inhaler Inhale 2 puffs into the lungs 2 times daily 10.6 g 11     gabapentin (NEURONTIN) 300 MG capsule Take 1 capsule (300 mg) by mouth 3 times daily 270 capsule 3     hydrochlorothiazide (HYDRODIURIL) 25 MG tablet Take 1 tablet (25 mg) by mouth daily 90 tablet 3     hydrOXYzine (ATARAX) 25 MG tablet Take 1-2 tablets (25-50 mg) by mouth every 8 hours as needed for anxiety 90 tablet 3     ibuprofen (ADVIL/MOTRIN) 800 MG tablet Take 1 tablet (800 mg) by mouth every 8 hours as needed for moderate pain 60 tablet 2     QUEtiapine (SEROQUEL) 50 MG tablet Take 1 tablet (50 mg) by mouth At Bedtime 90 tablet 3     risperiDONE (RISPERDAL) 1 MG tablet Take 1 tablet (1 mg) by mouth 2 times daily 180 tablet 3     traZODone (DESYREL) 100 MG tablet Take 1 tablet (100 mg) by mouth At Bedtime 90 tablet 3     acetaminophen (TYLENOL EXTRA STRENGTH) 500 MG tablet [ACETAMINOPHEN (TYLENOL EXTRA STRENGTH) 500 MG TABLET] Take 2 tablets (1,000 mg total) by mouth every 6 (six) hours as needed for pain. (Patient not taking: Reported on 8/17/2022) 100 tablet 2       Outside Notes summarized:   Labs, x-rays, cardiology, GI tests reviewed: Labs ordered.  Anemia noted from 2 years ago  Recent Labs   Lab Test 10/26/20  1217 10/21/20  0705 10/07/20  0908 08/19/20  1000   HGB  --   --   --  9.9*   WBC  --   --   --  12.5*    140   < > 138   POTASSIUM 3.7 4.1   < > 4.0   CR 0.93 0.95   < > 1.03    < > = values in this interval not displayed.     No results found for: [id number]  Lab Results   Component Value Date    CHOL 200 09/25/2019     New  orders:   Orders Placed This Encounter   Procedures     REVIEW OF HEALTH MAINTENANCE PROTOCOL ORDERS     Pneumococcal 20 Valent Conjugate (Prevnar 20)     Vitamin D Deficiency     Vitamin B12     Comprehensive metabolic panel     Erythrocyte sedimentation rate auto     Ferritin     CRP inflammation     Lipid Profile     TSH     Uric acid     CBC with Platelets & Differential       Independent review of:    Supplemental history by:      Patient has given verbal consent to a Video visit?  Yes  How would you like to obtain your AVS?  MyChart  Will anyone else be joining your video visit? No       Video-Visit Details  Type of service:  Video Visit  Originating Location (pt. Location): Home  Distant Location (provider location):   [facility]    History of Present Illness       Reason for visit:  Forms       Vitaliy Wharton MD  [facility]    Video Start Time: 5:09 PM  Video End time:  5:36 PM  Face to face plus orders: 27 minutes  Documentation time:  3 minutes     The visit lasted a total of 28 minutes

## 2022-08-17 NOTE — PROGRESS NOTES
"Valente is a 41 year old who is being evaluated via a billable video visit.      How would you like to obtain your AVS? MyChart  If the video visit is dropped, the invitation should be resent by: Text to cell phone: [phone]  Will anyone else be joining your video visit? No  {If patient encounters technical issues they should call [phone] :973966}        {PROVIDER CHARTING PREFERENCE:488742}    Subjective   Valente is a 41 year old{ACCOMPANIED BY STATEMENT (Optional):739150}, presenting for the following health issues:  Follow Up and Forms      History of Present Illness       Reason for visit:  Forms        {SUPERLIST (Optional):538533}  {additonal problems for provider to add (Optional):700560}    Review of Systems   {ROS COMP (Optional):712287}      Objective           Vitals:  No vitals were obtained today due to virtual visit.    Physical Exam   {video visit exam brief selected:207554::\"GENERAL: Healthy, alert and no distress\",\"EYES: Eyes grossly normal to inspection.  No discharge or erythema, or obvious scleral/conjunctival abnormalities.\",\"RESP: No audible wheeze, cough, or visible cyanosis.  No visible retractions or increased work of breathing.  \",\"SKIN: Visible skin clear. No significant rash, abnormal pigmentation or lesions.\",\"NEURO: Cranial nerves grossly intact.  Mentation and speech appropriate for age.\",\"PSYCH: Mentation appears normal, affect normal/bright, judgement and insight intact, normal speech and appearance well-groomed.\"}    {Diagnostic Test Results (Optional):706610}    {AMBULATORY ATTESTATION (Optional):569650}        Video-Visit Details    Video Start Time: {video visit start/end time for provider to select:561844}    Type of service:  Video Visit    Video End Time:{video visit start/end time for provider to select:316914}    Originating Location (pt. Location): {video visit patient location:114321::\"Home\"}    Distant Location (provider location):  [facility]. " "[facility] [employer] [facility]     Platform used for Video Visit: {Virtual Visit Platforms:737749::\"Nidhi\"}    .  ..  "

## 2022-08-17 NOTE — PATIENT INSTRUCTIONS
Problem list cleared up    Medication list cleared up and refilled 90-day supplies for 1 year    Update labs and vaccines    Consider evaluation for chronic pain    Labs to monitor medicines and chronic pain including uric acid, thyroid and B12    Complete paperwork through staff

## 2022-08-18 ENCOUNTER — TELEPHONE (OUTPATIENT)
Dept: INTERNAL MEDICINE | Facility: CLINIC | Age: 42
End: 2022-08-18

## 2022-08-18 ENCOUNTER — MYC MEDICAL ADVICE (OUTPATIENT)
Dept: INTERNAL MEDICINE | Facility: CLINIC | Age: 42
End: 2022-08-18

## 2022-08-18 NOTE — TELEPHONE ENCOUNTER
08/18 received a fax form today for this pt to be filled out pt had a virtual visit with Akiko Slade Yesterday and he was asked to fill them out, will bring upstairs today

## 2022-09-25 ENCOUNTER — HEALTH MAINTENANCE LETTER (OUTPATIENT)
Age: 42
End: 2022-09-25

## 2022-09-26 NOTE — TELEPHONE ENCOUNTER
Please call pt and let him know if form was faxed.     He stated someone called him today but I dont see any notes.

## 2022-12-19 ENCOUNTER — TELEPHONE (OUTPATIENT)
Dept: NURSING | Facility: CLINIC | Age: 42
End: 2022-12-19

## 2022-12-19 NOTE — TELEPHONE ENCOUNTER
[facility] is calling wanting to verify pts medications     No consent or release of information on file     [facility] office will get pt to sign a release and fax it into primary clinic     No triage       Swathi Donauhe RN  [place] Nurse Advisor  12:34 PM 12/19/2022

## 2022-12-26 NOTE — TELEPHONE ENCOUNTER
Patient's chart was reviewed and consent to communicate only lists patient's mother.  The clinic does not have consent to speak to fiance.  Patient does not appear in [place] [employer] search.  It is unclear where patient records or refills need to be sent.    Janessa Goldstein M.A., LPN  Clinic Manager  [facility]

## 2022-12-26 NOTE — TELEPHONE ENCOUNTER
[facility]al facility calling on the status of patient medications to be refilled (he will need all meds refilled)    Please call:  Zayda carrion  [phone]

## 2023-02-13 DIAGNOSIS — G89.29 CHRONIC BILATERAL LOW BACK PAIN WITHOUT SCIATICA: ICD-10-CM

## 2023-02-13 DIAGNOSIS — M54.50 CHRONIC BILATERAL LOW BACK PAIN WITHOUT SCIATICA: ICD-10-CM

## 2023-02-15 RX ORDER — GABAPENTIN 300 MG/1
300 CAPSULE ORAL 3 TIMES DAILY
Qty: 270 CAPSULE | Refills: 3 | Status: SHIPPED | OUTPATIENT
Start: 2023-02-15 | End: 2023-09-27

## 2023-08-15 ENCOUNTER — TELEPHONE (OUTPATIENT)
Dept: INTERNAL MEDICINE | Facility: CLINIC | Age: 43
End: 2023-08-15
Payer: COMMERCIAL

## 2023-08-15 NOTE — TELEPHONE ENCOUNTER
August 15, 2023    [facility] for Medical Opinion was received via fax for Dr. Mcgovern to sign.  Patient label was attached to paperwork and placed in provider's inbox to be signed.    Brittney Rios

## 2023-08-30 NOTE — TELEPHONE ENCOUNTER
Patient called to check on the status of his form, and would like to ask Dr. Mcgovern if he would be able to complete it ASAP

## 2023-08-31 NOTE — TELEPHONE ENCOUNTER
Paperwork reviewed, release of information was not signed by patient.   Additionally, patient has not been seen for over 1 year by Dr. Mcgovern, pt will need appointment for paperwork to be completed.     Attempted to call pt, no answer (1/3). Left message requesting pt to call back clinic.     When patient calls back clinic, pt will need to be scheduled for an appointment with Dr. Mcgovern for this form completion.     Paperwork has been placed in [facility] bin in TC work area to await patient appointment for completion of paperwork.

## 2023-08-31 NOTE — TELEPHONE ENCOUNTER
Paperwork was placed in [employer] bin in TC area. Dr. Mcgovern wrote that the patient needs an appointment to fill out the paperwork. Fiona attempted to call but the phone # is no longer in service.

## 2023-09-01 NOTE — TELEPHONE ENCOUNTER
09/01 sent a my chart  message as well, and left another message for pt to call and schedule an appt

## 2023-09-07 ENCOUNTER — OFFICE VISIT (OUTPATIENT)
Dept: INTERNAL MEDICINE | Facility: CLINIC | Age: 43
End: 2023-09-07
Payer: COMMERCIAL

## 2023-09-07 VITALS
RESPIRATION RATE: 18 BRPM | SYSTOLIC BLOOD PRESSURE: 128 MMHG | HEART RATE: 114 BPM | HEIGHT: 74 IN | DIASTOLIC BLOOD PRESSURE: 72 MMHG | BODY MASS INDEX: 38.8 KG/M2 | WEIGHT: 302.3 LBS | TEMPERATURE: 97.7 F | OXYGEN SATURATION: 95 %

## 2023-09-07 DIAGNOSIS — E66.812 CLASS 2 SEVERE OBESITY DUE TO EXCESS CALORIES WITH SERIOUS COMORBIDITY AND BODY MASS INDEX (BMI) OF 38.0 TO 38.9 IN ADULT (H): ICD-10-CM

## 2023-09-07 DIAGNOSIS — F10.21 RECOVERING ALCOHOLIC IN REMISSION (H): ICD-10-CM

## 2023-09-07 DIAGNOSIS — F20.9 SCHIZOPHRENIA, UNSPECIFIED TYPE (H): Primary | ICD-10-CM

## 2023-09-07 DIAGNOSIS — E66.01 CLASS 2 SEVERE OBESITY DUE TO EXCESS CALORIES WITH SERIOUS COMORBIDITY AND BODY MASS INDEX (BMI) OF 38.0 TO 38.9 IN ADULT (H): ICD-10-CM

## 2023-09-07 DIAGNOSIS — I10 BENIGN ESSENTIAL HYPERTENSION: ICD-10-CM

## 2023-09-07 PROCEDURE — 99214 OFFICE O/P EST MOD 30 MIN: CPT | Performed by: INTERNAL MEDICINE

## 2023-09-07 ASSESSMENT — PATIENT HEALTH QUESTIONNAIRE - PHQ9
10. IF YOU CHECKED OFF ANY PROBLEMS, HOW DIFFICULT HAVE THESE PROBLEMS MADE IT FOR YOU TO DO YOUR WORK, TAKE CARE OF THINGS AT HOME, OR GET ALONG WITH OTHER PEOPLE: EXTREMELY DIFFICULT
SUM OF ALL RESPONSES TO PHQ QUESTIONS 1-9: 10
SUM OF ALL RESPONSES TO PHQ QUESTIONS 1-9: 10

## 2023-09-07 ASSESSMENT — ASTHMA QUESTIONNAIRES: ACT_TOTALSCORE: 24

## 2023-09-07 NOTE — PROGRESS NOTES
1. Schizophrenia, unspecified type (H)  Is a 43-year-old man recently in treatment for chemical dependency, now at a sober house.  States his mood is well controlled.  He is following with his psychiatrist and taking his medications as prescribed.  Forms for [facility] have been filled out.    2. Recovering alcoholic in remission (H)  Recently in treatment, now living in sober housing, continue outpatient treatment    3. Benign essential hypertension  Blood pressure controlled continue same    4. Class 2 severe obesity due to excess calories with serious comorbidity and body mass index (BMI) of 38.0 to 38.9 in adult (H)  Discussed importance of regular exercise healthy diet and modest weight loss    I recommend he come in at some point in the next month or so for annual physical      Subjective   Valente is a 43 year old, presenting for the following health issues:  office visit (Needed appointment for paperwork to be signed.)      9/7/2023    11:39 AM   Additional Questions   Roomed by RIVAS Chi       History of Present Illness     Asthma:  He presents for follow up of asthma.  He has no cough, no wheezing, and no shortness of breath.  He is using a relief medication a few times a week. He does not miss any doses of his controller medication throughout the week. Patient is aware of the following triggers: none. The patient has not had a visit to the Emergency Room, Urgent Care or Hospital due to asthma since the last clinic visit.     He eats 4 or more servings of fruits and vegetables daily.He consumes 3 sweetened beverage(s) daily.He exercises with enough effort to increase his heart rate 9 or less minutes per day.  He exercises with enough effort to increase his heart rate 3 or less days per week.   He is taking medications regularly.           Review of Systems       Objective    /72 (BP Location: Left arm, Patient Position: Sitting, Cuff Size: Adult Large)   Pulse 114   Temp 97.7  F (36.5  C)  "(Tympanic)   Resp 18   Ht 1.88 m (6' 2\")   Wt 137.1 kg (302 lb 4.8 oz)   SpO2 95%   BMI 38.81 kg/m    Body mass index is 38.81 kg/m .  Physical Exam                         "

## 2023-09-27 DIAGNOSIS — G89.29 CHRONIC BILATERAL LOW BACK PAIN WITHOUT SCIATICA: ICD-10-CM

## 2023-09-27 DIAGNOSIS — I10 BENIGN ESSENTIAL HYPERTENSION: ICD-10-CM

## 2023-09-27 DIAGNOSIS — M54.50 ACUTE BILATERAL LOW BACK PAIN WITHOUT SCIATICA: ICD-10-CM

## 2023-09-27 DIAGNOSIS — M54.50 CHRONIC BILATERAL LOW BACK PAIN WITHOUT SCIATICA: ICD-10-CM

## 2023-09-27 DIAGNOSIS — F20.9 SCHIZOPHRENIA, UNSPECIFIED TYPE (H): ICD-10-CM

## 2023-09-27 DIAGNOSIS — J45.21 MILD INTERMITTENT ASTHMA WITH EXACERBATION: ICD-10-CM

## 2023-09-27 DIAGNOSIS — F33.41 RECURRENT MAJOR DEPRESSIVE DISORDER, IN PARTIAL REMISSION (H): ICD-10-CM

## 2023-09-27 DIAGNOSIS — F51.01 PRIMARY INSOMNIA: ICD-10-CM

## 2023-09-27 RX ORDER — BUPROPION HYDROCHLORIDE 300 MG/1
300 TABLET ORAL DAILY
Qty: 90 TABLET | Refills: 3 | Status: SHIPPED | OUTPATIENT
Start: 2023-09-27

## 2023-09-27 RX ORDER — ALBUTEROL SULFATE 90 UG/1
1-2 AEROSOL, METERED RESPIRATORY (INHALATION) EVERY 6 HOURS PRN
Qty: 8.5 G | Refills: 1 | Status: SHIPPED | OUTPATIENT
Start: 2023-09-27 | End: 2024-09-16

## 2023-09-27 RX ORDER — RISPERIDONE 1 MG/1
1 TABLET ORAL 2 TIMES DAILY
Qty: 180 TABLET | Refills: 3 | Status: SHIPPED | OUTPATIENT
Start: 2023-09-27

## 2023-09-27 RX ORDER — ATENOLOL 50 MG/1
50 TABLET ORAL DAILY
Qty: 90 TABLET | Refills: 3 | Status: SHIPPED | OUTPATIENT
Start: 2023-09-27

## 2023-09-27 RX ORDER — HYDROXYZINE HYDROCHLORIDE 25 MG/1
25-50 TABLET, FILM COATED ORAL EVERY 8 HOURS PRN
Qty: 90 TABLET | Refills: 3 | Status: SHIPPED | OUTPATIENT
Start: 2023-09-27

## 2023-09-27 RX ORDER — QUETIAPINE FUMARATE 50 MG/1
50 TABLET, FILM COATED ORAL AT BEDTIME
Qty: 90 TABLET | Refills: 3 | Status: SHIPPED | OUTPATIENT
Start: 2023-09-27

## 2023-09-27 RX ORDER — TRAZODONE HYDROCHLORIDE 100 MG/1
100 TABLET ORAL AT BEDTIME
Qty: 90 TABLET | Refills: 3 | Status: SHIPPED | OUTPATIENT
Start: 2023-09-27

## 2023-09-27 RX ORDER — IBUPROFEN 800 MG/1
800 TABLET, FILM COATED ORAL EVERY 8 HOURS PRN
Qty: 60 TABLET | Refills: 2 | Status: SHIPPED | OUTPATIENT
Start: 2023-09-27

## 2023-09-27 RX ORDER — HYDROCHLOROTHIAZIDE 25 MG/1
25 TABLET ORAL DAILY
Qty: 90 TABLET | Refills: 3 | Status: SHIPPED | OUTPATIENT
Start: 2023-09-27

## 2023-09-27 RX ORDER — CYCLOBENZAPRINE HCL 10 MG
10 TABLET ORAL 2 TIMES DAILY PRN
Qty: 90 TABLET | Refills: 3 | Status: SHIPPED | OUTPATIENT
Start: 2023-09-27

## 2023-09-27 RX ORDER — ACETAMINOPHEN 500 MG
1000 TABLET ORAL EVERY 6 HOURS PRN
Qty: 100 TABLET | Refills: 2 | Status: SHIPPED | OUTPATIENT
Start: 2023-09-27

## 2023-09-27 RX ORDER — GABAPENTIN 300 MG/1
300 CAPSULE ORAL 3 TIMES DAILY
Qty: 270 CAPSULE | Refills: 3 | Status: SHIPPED | OUTPATIENT
Start: 2023-09-27

## 2023-10-15 ENCOUNTER — HEALTH MAINTENANCE LETTER (OUTPATIENT)
Age: 43
End: 2023-10-15

## 2023-11-10 ENCOUNTER — TELEPHONE (OUTPATIENT)
Dept: INTERNAL MEDICINE | Facility: CLINIC | Age: 43
End: 2023-11-10

## 2023-11-10 NOTE — TELEPHONE ENCOUNTER
General Call      Reason for Call:  pt calling and asking if in his chart one of his diagnosis is Post Tramatic Stress disorder    Please advise    What are your questions or concerns:  n/a    Date of last appointment with provider: n/a    Could we send this information to you in [facility] or would you prefer to receive a phone call?:   Patient would prefer a phone call   Okay to leave a detailed message?: Yes at Other phone number:  [phone]

## 2023-11-10 NOTE — TELEPHONE ENCOUNTER
Spoke with patient who is requesting a list of his diagnoses faxed over to his new psychiatric [facility] with [facility]. Fax number he provided [phone]. Sent as requested.

## 2024-01-29 ENCOUNTER — TELEPHONE (OUTPATIENT)
Dept: INTERNAL MEDICINE | Facility: CLINIC | Age: 44
End: 2024-01-29

## 2024-01-29 NOTE — TELEPHONE ENCOUNTER
General Call      Reason for Call:  pt calling and asking his PCP to please write a letter stating that he is OK to  with his DX of Mental Illness    Please advise    What are your questions or concerns:  n/a    Date of last appointment with provider: n/a    Could we send this information to you in [facility] or would you prefer to receive a phone call?:   Patient would prefer a phone call   Okay to leave a detailed message?: Yes at Other phone number:  [phone]

## 2024-01-30 NOTE — TELEPHONE ENCOUNTER
Left V/M for pt to call back to schedule a physical and a separate appt  with a different MD to get his letter done sooner.

## 2024-03-29 ENCOUNTER — TELEPHONE (OUTPATIENT)
Dept: INTERNAL MEDICINE | Facility: CLINIC | Age: 44
End: 2024-03-29
Payer: COMMERCIAL

## 2024-03-29 NOTE — TELEPHONE ENCOUNTER
March 29, 2024    [facility] was received via fax for Dr. Mcgovern to sign.  Patient label was attached to paperwork and placed in provider's inbox to be signed.    Eden Haile

## 2024-04-05 NOTE — TELEPHONE ENCOUNTER
April 5, 2024    Long term services form was picked up from outbox of Dr. Mcgovern.  Paperwork has been reviewed and is complete.  Per initial initial request, this was sent via fax to [phone].     Carey Juan

## 2024-04-29 ENCOUNTER — TELEPHONE (OUTPATIENT)
Dept: INTERNAL MEDICINE | Facility: CLINIC | Age: 44
End: 2024-04-29
Payer: COMMERCIAL

## 2024-04-29 NOTE — TELEPHONE ENCOUNTER
April 29, 2024    [place] [facility] Medical Opinion Form was received via fax for Dr. Mcgovern.  Patient label was attached to paperwork and placed in provider's inbox to be signed.    Eden Haile

## 2024-06-20 NOTE — TELEPHONE ENCOUNTER
June 20, 2024    [place] [facility] Medical Opinion Form  was picked up from outbox of Dr. Mcgovern and pt needs appt to fill out form.    Carey Juan

## 2024-07-09 ENCOUNTER — VIRTUAL VISIT (OUTPATIENT)
Dept: INTERNAL MEDICINE | Facility: CLINIC | Age: 44
End: 2024-07-09
Payer: COMMERCIAL

## 2024-07-09 DIAGNOSIS — F20.9 SCHIZOPHRENIA, UNSPECIFIED TYPE (H): Primary | ICD-10-CM

## 2024-07-09 DIAGNOSIS — F10.21 RECOVERING ALCOHOLIC IN REMISSION (H): ICD-10-CM

## 2024-07-09 DIAGNOSIS — I10 BENIGN ESSENTIAL HYPERTENSION: ICD-10-CM

## 2024-07-09 PROBLEM — F33.41 RECURRENT MAJOR DEPRESSIVE DISORDER, IN PARTIAL REMISSION (H): Status: RESOLVED | Noted: 2022-08-17 | Resolved: 2024-07-09

## 2024-07-09 PROBLEM — Z72.0 TOBACCO ABUSE: Status: RESOLVED | Noted: 2019-09-04 | Resolved: 2024-07-09

## 2024-07-09 PROBLEM — M54.50 BILATERAL LOW BACK PAIN WITHOUT SCIATICA: Status: RESOLVED | Noted: 2019-09-04 | Resolved: 2024-07-09

## 2024-07-09 PROCEDURE — 99214 OFFICE O/P EST MOD 30 MIN: CPT | Mod: 95 | Performed by: INTERNAL MEDICINE

## 2024-07-09 PROCEDURE — 96127 BRIEF EMOTIONAL/BEHAV ASSMT: CPT | Mod: 95 | Performed by: INTERNAL MEDICINE

## 2024-07-09 PROCEDURE — G2211 COMPLEX E/M VISIT ADD ON: HCPCS | Mod: 95 | Performed by: INTERNAL MEDICINE

## 2024-07-09 ASSESSMENT — ANXIETY QUESTIONNAIRES
6. BECOMING EASILY ANNOYED OR IRRITABLE: NOT AT ALL
2. NOT BEING ABLE TO STOP OR CONTROL WORRYING: NOT AT ALL
GAD7 TOTAL SCORE: 0
GAD7 TOTAL SCORE: 0
7. FEELING AFRAID AS IF SOMETHING AWFUL MIGHT HAPPEN: NOT AT ALL
GAD7 TOTAL SCORE: 0
4. TROUBLE RELAXING: NOT AT ALL
3. WORRYING TOO MUCH ABOUT DIFFERENT THINGS: NOT AT ALL
7. FEELING AFRAID AS IF SOMETHING AWFUL MIGHT HAPPEN: NOT AT ALL
1. FEELING NERVOUS, ANXIOUS, OR ON EDGE: NOT AT ALL
5. BEING SO RESTLESS THAT IT IS HARD TO SIT STILL: NOT AT ALL

## 2024-07-09 ASSESSMENT — PATIENT HEALTH QUESTIONNAIRE - PHQ9
SUM OF ALL RESPONSES TO PHQ QUESTIONS 1-9: 5
10. IF YOU CHECKED OFF ANY PROBLEMS, HOW DIFFICULT HAVE THESE PROBLEMS MADE IT FOR YOU TO DO YOUR WORK, TAKE CARE OF THINGS AT HOME, OR GET ALONG WITH OTHER PEOPLE: SOMEWHAT DIFFICULT
SUM OF ALL RESPONSES TO PHQ QUESTIONS 1-9: 5

## 2024-07-09 ASSESSMENT — ASTHMA QUESTIONNAIRES
ACT_TOTALSCORE: 14
QUESTION_3 LAST FOUR WEEKS HOW OFTEN DID YOUR ASTHMA SYMPTOMS (WHEEZING, COUGHING, SHORTNESS OF BREATH, CHEST TIGHTNESS OR PAIN) WAKE YOU UP AT NIGHT OR EARLIER THAN USUAL IN THE MORNING: FOUR OR MORE NIGHTS A WEEK
QUESTION_1 LAST FOUR WEEKS HOW MUCH OF THE TIME DID YOUR ASTHMA KEEP YOU FROM GETTING AS MUCH DONE AT WORK, SCHOOL OR AT HOME: MOST OF THE TIME
QUESTION_5 LAST FOUR WEEKS HOW WOULD YOU RATE YOUR ASTHMA CONTROL: COMPLETELY CONTROLLED
QUESTION_2 LAST FOUR WEEKS HOW OFTEN HAVE YOU HAD SHORTNESS OF BREATH: THREE TO SIX TIMES A WEEK
QUESTION_4 LAST FOUR WEEKS HOW OFTEN HAVE YOU USED YOUR RESCUE INHALER OR NEBULIZER MEDICATION (SUCH AS ALBUTEROL): TWO OR THREE TIMES PER WEEK
ACT_TOTALSCORE: 14

## 2024-07-09 NOTE — PROGRESS NOTES
Valente is a 43 year old who is being evaluated via a billable video visit.          1. Schizophrenia, unspecified type (H)  This is a 43-year-old man who follows with psychiatry.  We reviewed his medications and he is taking them as prescribed.  He has been sober since he finished treatment for chemical dependency about 8 months ago.  Forms filled out.    2. Recovering alcoholic in remission (H)  Remains sober, discussed    3. Benign essential hypertension  States his blood pressure has been okay, continue current medications including hydrochlorothiazide      Subjective   Valente is a 43 year old, presenting for the following health issues:  Forms (Needs medical opinion form filled out for his financial benefits)      7/9/2024     1:10 PM   Additional Questions   Roomed by Susan   Accompanied by N/A         7/9/2024     1:10 PM   Patient Reported Additional Medications   Patient reports taking the following new medications N/A     History of Present Illness       Mental Health Follow-up:  Patient presents to follow-up on Depression & Anxiety.Patient's depression since last visit has been:  Good  The patient is not having other symptoms associated with depression.  Patient's anxiety since last visit has been:  Medium  The patient is not having other symptoms associated with anxiety.  Any significant life events: financial concerns and housing concerns  Patient is not feeling anxious or having panic attacks.  Patient has no concerns about alcohol or drug use.    Reason for visit:  Form    He eats 0-1 servings of fruits and vegetables daily.He consumes 2 sweetened beverage(s) daily.He exercises with enough effort to increase his heart rate 9 or less minutes per day.  He exercises with enough effort to increase his heart rate 3 or less days per week.   He is taking medications regularly.           Objective           Vitals:  No vitals were obtained today due to virtual visit.    Physical Exam           Video-Visit  Details    Type of service:  Video Visit   Originating Location (pt. Location): Home    Distant Location (provider location):  On-site  Platform used for Video Visit: Nidhi  Signed Electronically by: Steven Mcgovern MD

## 2024-07-10 NOTE — TELEPHONE ENCOUNTER
July 10, 2024    [place] [facility] Medical Opinion Form was picked up from outbox of Dr. Mcgovern and sent via fax to [phone].    Eden Haile

## 2024-09-16 DIAGNOSIS — J45.21 MILD INTERMITTENT ASTHMA WITH EXACERBATION: ICD-10-CM

## 2024-09-16 RX ORDER — ALBUTEROL SULFATE 90 UG/1
1-2 AEROSOL, METERED RESPIRATORY (INHALATION) EVERY 6 HOURS PRN
Qty: 8.5 G | Refills: 1 | Status: SHIPPED | OUTPATIENT
Start: 2024-09-16

## 2024-12-07 ENCOUNTER — HEALTH MAINTENANCE LETTER (OUTPATIENT)
Age: 44
End: 2024-12-07

## 2025-01-27 DIAGNOSIS — M54.50 CHRONIC BILATERAL LOW BACK PAIN WITHOUT SCIATICA: ICD-10-CM

## 2025-01-27 DIAGNOSIS — F20.9 SCHIZOPHRENIA, UNSPECIFIED TYPE (H): ICD-10-CM

## 2025-01-27 DIAGNOSIS — I10 BENIGN ESSENTIAL HYPERTENSION: ICD-10-CM

## 2025-01-27 DIAGNOSIS — F51.01 PRIMARY INSOMNIA: ICD-10-CM

## 2025-01-27 DIAGNOSIS — G89.29 CHRONIC BILATERAL LOW BACK PAIN WITHOUT SCIATICA: ICD-10-CM

## 2025-01-27 DIAGNOSIS — M54.50 ACUTE BILATERAL LOW BACK PAIN WITHOUT SCIATICA: ICD-10-CM

## 2025-01-27 DIAGNOSIS — F33.41 RECURRENT MAJOR DEPRESSIVE DISORDER, IN PARTIAL REMISSION: ICD-10-CM

## 2025-01-27 RX ORDER — QUETIAPINE FUMARATE 50 MG/1
50 TABLET, FILM COATED ORAL AT BEDTIME
Qty: 90 TABLET | Refills: 3 | Status: SHIPPED | OUTPATIENT
Start: 2025-01-27

## 2025-01-27 RX ORDER — ACETAMINOPHEN 500 MG
1000 TABLET ORAL EVERY 6 HOURS PRN
Qty: 100 TABLET | Refills: 1 | Status: SHIPPED | OUTPATIENT
Start: 2025-01-27

## 2025-01-27 RX ORDER — IBUPROFEN 800 MG/1
800 TABLET, FILM COATED ORAL EVERY 8 HOURS PRN
Qty: 60 TABLET | Refills: 2 | Status: SHIPPED | OUTPATIENT
Start: 2025-01-27

## 2025-01-27 RX ORDER — GABAPENTIN 300 MG/1
300 CAPSULE ORAL 3 TIMES DAILY
Qty: 270 CAPSULE | Refills: 3 | Status: SHIPPED | OUTPATIENT
Start: 2025-01-27

## 2025-01-27 RX ORDER — BUPROPION HYDROCHLORIDE 300 MG/1
300 TABLET ORAL DAILY
Qty: 90 TABLET | Refills: 3 | Status: SHIPPED | OUTPATIENT
Start: 2025-01-27

## 2025-01-27 RX ORDER — ATENOLOL 50 MG/1
50 TABLET ORAL DAILY
Qty: 90 TABLET | Refills: 3 | Status: SHIPPED | OUTPATIENT
Start: 2025-01-27

## 2025-01-27 RX ORDER — HYDROCHLOROTHIAZIDE 25 MG/1
25 TABLET ORAL DAILY
Qty: 90 TABLET | Refills: 3 | Status: SHIPPED | OUTPATIENT
Start: 2025-01-27

## 2025-01-27 RX ORDER — RISPERIDONE 1 MG/1
1 TABLET ORAL 2 TIMES DAILY
Qty: 180 TABLET | Refills: 3 | Status: SHIPPED | OUTPATIENT
Start: 2025-01-27

## 2025-01-27 RX ORDER — HYDROXYZINE HYDROCHLORIDE 25 MG/1
25-50 TABLET, FILM COATED ORAL EVERY 8 HOURS PRN
Qty: 90 TABLET | Refills: 1 | Status: SHIPPED | OUTPATIENT
Start: 2025-01-27

## 2025-01-30 ENCOUNTER — TELEPHONE (OUTPATIENT)
Dept: INTERNAL MEDICINE | Facility: CLINIC | Age: 45
End: 2025-01-30

## 2025-01-30 DIAGNOSIS — F33.41 RECURRENT MAJOR DEPRESSIVE DISORDER, IN PARTIAL REMISSION: ICD-10-CM

## 2025-01-30 DIAGNOSIS — G89.29 CHRONIC BILATERAL LOW BACK PAIN WITHOUT SCIATICA: ICD-10-CM

## 2025-01-30 DIAGNOSIS — F51.01 PRIMARY INSOMNIA: ICD-10-CM

## 2025-01-30 DIAGNOSIS — M54.50 CHRONIC BILATERAL LOW BACK PAIN WITHOUT SCIATICA: ICD-10-CM

## 2025-01-30 RX ORDER — CYCLOBENZAPRINE HCL 10 MG
10 TABLET ORAL 2 TIMES DAILY PRN
Qty: 90 TABLET | Refills: 3 | Status: SHIPPED | OUTPATIENT
Start: 2025-01-30

## 2025-01-30 RX ORDER — TRAZODONE HYDROCHLORIDE 100 MG/1
100 TABLET ORAL AT BEDTIME
Qty: 90 TABLET | Refills: 3 | Status: SHIPPED | OUTPATIENT
Start: 2025-01-30

## 2025-01-30 NOTE — TELEPHONE ENCOUNTER
Order/Referral Request    Who is requesting: Patient    Orders being requested: 1. Dentist, 2. cane, and 3. walker with seat.     Does patient have a preference on a Group/Provider/Facility? [facility]    Does patient have an appointment scheduled?: No    Where to send orders: Place in EPIC     Could we send this information to you in Health system or would you prefer to receive a phone call?:   Patient would prefer a phone call     Okay to leave a detailed message?: Yes at Cell number on file:    Telephone Information:   Mobile [phone]

## 2025-02-05 NOTE — TELEPHONE ENCOUNTER
Lm on vm to call back and speak to the care team to schedule an appt for those requests/referrals.

## 2025-02-12 ENCOUNTER — VIRTUAL VISIT (OUTPATIENT)
Dept: INTERNAL MEDICINE | Facility: CLINIC | Age: 45
End: 2025-02-12
Payer: COMMERCIAL

## 2025-02-12 DIAGNOSIS — T79.A21S TRAUMATIC COMPARTMENT SYNDROME OF RIGHT LOWER EXTREMITY, SEQUELA: Primary | ICD-10-CM

## 2025-02-12 DIAGNOSIS — K08.9 POOR DENTITION: ICD-10-CM

## 2025-02-12 PROCEDURE — 98013 SYNCH AUDIO-ONLY EST LOW 20: CPT | Performed by: INTERNAL MEDICINE

## 2025-02-12 ASSESSMENT — ASTHMA QUESTIONNAIRES
QUESTION_3 LAST FOUR WEEKS HOW OFTEN DID YOUR ASTHMA SYMPTOMS (WHEEZING, COUGHING, SHORTNESS OF BREATH, CHEST TIGHTNESS OR PAIN) WAKE YOU UP AT NIGHT OR EARLIER THAN USUAL IN THE MORNING: NOT AT ALL
ACT_TOTALSCORE: 19
QUESTION_5 LAST FOUR WEEKS HOW WOULD YOU RATE YOUR ASTHMA CONTROL: SOMEWHAT CONTROLLED
QUESTION_1 LAST FOUR WEEKS HOW MUCH OF THE TIME DID YOUR ASTHMA KEEP YOU FROM GETTING AS MUCH DONE AT WORK, SCHOOL OR AT HOME: A LITTLE OF THE TIME
ACT_TOTALSCORE: 19
QUESTION_2 LAST FOUR WEEKS HOW OFTEN HAVE YOU HAD SHORTNESS OF BREATH: ONCE OR TWICE A WEEK
QUESTION_4 LAST FOUR WEEKS HOW OFTEN HAVE YOU USED YOUR RESCUE INHALER OR NEBULIZER MEDICATION (SUCH AS ALBUTEROL): TWO OR THREE TIMES PER WEEK

## 2025-02-12 ASSESSMENT — PATIENT HEALTH QUESTIONNAIRE - PHQ9
SUM OF ALL RESPONSES TO PHQ QUESTIONS 1-9: 12
SUM OF ALL RESPONSES TO PHQ QUESTIONS 1-9: 12
10. IF YOU CHECKED OFF ANY PROBLEMS, HOW DIFFICULT HAVE THESE PROBLEMS MADE IT FOR YOU TO DO YOUR WORK, TAKE CARE OF THINGS AT HOME, OR GET ALONG WITH OTHER PEOPLE: VERY DIFFICULT

## 2025-03-05 ENCOUNTER — TELEPHONE (OUTPATIENT)
Dept: INTERNAL MEDICINE | Facility: CLINIC | Age: 45
End: 2025-03-05
Payer: COMMERCIAL

## 2025-03-05 NOTE — TELEPHONE ENCOUNTER
March 5, 2025    [facility] ICD-10 verification form was received via fax for Dr. Mcgovern.  Patient label was attached to paperwork and placed in provider's inbox to be signed.    Aneudy Ellington

## 2025-05-08 DIAGNOSIS — G89.29 CHRONIC BILATERAL LOW BACK PAIN WITHOUT SCIATICA: ICD-10-CM

## 2025-05-08 DIAGNOSIS — F51.01 PRIMARY INSOMNIA: ICD-10-CM

## 2025-05-08 DIAGNOSIS — M54.50 CHRONIC BILATERAL LOW BACK PAIN WITHOUT SCIATICA: ICD-10-CM

## 2025-05-08 DIAGNOSIS — F33.41 RECURRENT MAJOR DEPRESSIVE DISORDER, IN PARTIAL REMISSION: ICD-10-CM

## 2025-05-08 RX ORDER — CYCLOBENZAPRINE HCL 10 MG
10 TABLET ORAL 2 TIMES DAILY PRN
Qty: 90 TABLET | Refills: 3 | Status: SHIPPED | OUTPATIENT
Start: 2025-05-08

## 2025-05-08 RX ORDER — TRAZODONE HYDROCHLORIDE 100 MG/1
100 TABLET ORAL AT BEDTIME
Qty: 90 TABLET | Refills: 3 | Status: CANCELLED | OUTPATIENT
Start: 2025-05-08

## 2025-05-08 RX ORDER — TRAZODONE HYDROCHLORIDE 100 MG/1
100 TABLET ORAL AT BEDTIME
Qty: 90 TABLET | Refills: 1 | Status: SHIPPED | OUTPATIENT
Start: 2025-05-08

## 2025-05-22 ENCOUNTER — OFFICE VISIT (OUTPATIENT)
Dept: URGENT CARE | Facility: URGENT CARE | Age: 45
End: 2025-05-22
Payer: COMMERCIAL

## 2025-05-22 VITALS
TEMPERATURE: 96.9 F | HEART RATE: 77 BPM | BODY MASS INDEX: 37.06 KG/M2 | DIASTOLIC BLOOD PRESSURE: 93 MMHG | OXYGEN SATURATION: 98 % | WEIGHT: 288.8 LBS | HEIGHT: 74 IN | RESPIRATION RATE: 20 BRPM | SYSTOLIC BLOOD PRESSURE: 141 MMHG

## 2025-05-22 DIAGNOSIS — K04.7 TOOTH INFECTION: Primary | ICD-10-CM

## 2025-05-22 RX ORDER — CLINDAMYCIN HYDROCHLORIDE 300 MG/1
300 CAPSULE ORAL 3 TIMES DAILY
Qty: 42 CAPSULE | Refills: 0 | Status: SHIPPED | OUTPATIENT
Start: 2025-05-22 | End: 2025-06-05

## 2025-05-22 NOTE — PATIENT INSTRUCTIONS
I have sent in an antibiotic to treat for you tooth infection. I have also placed a referral to a dentist for further management and evaluation, I would try to see them within the next 3-5 days ideally if not sooner. Continue to take tylenol and ibuprofen as needed for pain.

## 2025-05-22 NOTE — PROGRESS NOTES
"Patient presents with:  Dental Problem: Lt upper \"broken off adult tooth\" for few years x ongoing 1.5 years but painful last night. Lt side headaches, swelling, sore and tender. APAP and IBU last does 2 hours prior to  arrival.       Clinical Decision Making:      ICD-10-CM    1. Tooth infection  K04.7 Dental Referral     clindamycin (CLEOCIN) 300 MG capsule     Dental Referral        Patient with left upper molar tooth infection, he does have a anaphylactic reaction to penicillins so sent in antibiotic treatment with clindamycin, we did discuss the risk of C. diff with this medication.  I also placed an urgent dental referral for further follow-up and evaluation.  Advise staying on top of Tylenol/ibuprofen as needed for pain. Patient instructions as below. Discussed red flag symptoms for when to return.       Patient Instructions   I have sent in an antibiotic to treat for you tooth infection. I have also placed a referral to a dentist for further management and evaluation, I would try to see them within the next 3-5 days ideally if not sooner. Continue to take tylenol and ibuprofen as needed for pain.     HPI:  Valente Pope Jr. is a 44 year old male who presents today with concerns of left upper tooth infection/pain. No fevers.   Part of his left upper tooth broke off about 5 years ago and has been having issue with his tooth every since, he has been in and out of rehab so he hasn't been able to see anyone for it. Has been taking tylenol and ibuprofen. Pain has been on and off for the last 2 years, has continued to worsen.     History obtained from the patient.    Problem List:  2023-09: Class 2 severe obesity due to excess calories with serious   comorbidity in adult (H)  2022-08: Severe alcohol use disorder (H)  2022-08: Posttraumatic stress disorder  2022-08: Recovering alcoholic in remission (H)  2022-08: Recurrent major depressive disorder, in partial remission  2022-08: Primary insomnia  2020-08: " "Traumatic compartment syndrome of right lower extremity,   initial encounter  2020: Closed fracture of right tibial plateau, initial encounter  2019: Moderate persistent asthma with exacerbation  2019: Benign essential hypertension  2019: Mild intermittent asthma with exacerbation  2019: Tobacco abuse  2019: Personal history of traumatic brain injury  2019: Bilateral low back pain without sciatica  2014: Generalized anxiety disorder  2014: Schizophrenia (H) - [facility]      Past Medical History:   Diagnosis Date    Closed fracture of right tibial plateau, initial encounter 2020    Severe alcohol use disorder (H) 2022    Quit 2019    Traumatic compartment syndrome of right lower extremity, initial encounter 2020       Social History     Tobacco Use    Smoking status: Every Day     Current packs/day: 0.00     Types: Cigarettes     Last attempt to quit: 2023     Years since quittin.8    Smokeless tobacco: Never   Substance Use Topics    Alcohol use: Not Currently       ROS is negative other than what is noted in HPI.       Vitals:    25 1222   BP: (!) 141/93   Pulse: 77   Resp: 20   Temp: 96.9  F (36.1  C)   TempSrc: Tympanic   SpO2: 98%   Weight: 131 kg (288 lb 12.8 oz)   Height: 1.867 m (6' 1.5\")       Physical Exam  Constitutional:       General: He is not in acute distress.     Appearance: He is not diaphoretic.   HENT:      Head: Normocephalic and atraumatic.      Right Ear: Tympanic membrane and external ear normal.      Left Ear: Tympanic membrane and external ear normal.      Mouth/Throat:      Mouth: Mucous membranes are moist.      Dentition: Gingival swelling and dental caries present.      Pharynx: Uvula midline. No oropharyngeal exudate or posterior oropharyngeal erythema.      Tonsils: No tonsillar abscesses.        Comments: Chipped tooth to left upper molar as marked in image with surrounding gingival swelling and erythema. No " notable abscess.    Eyes:      Extraocular Movements: Extraocular movements intact.      Conjunctiva/sclera: Conjunctivae normal.      Pupils: Pupils are equal, round, and reactive to light.   Cardiovascular:      Rate and Rhythm: Normal rate and regular rhythm.   Pulmonary:      Effort: Pulmonary effort is normal. No respiratory distress.   Neurological:      Mental Status: He is alert.   Psychiatric:         Mood and Affect: Mood normal.         Thought Content: Thought content normal.         Judgment: Judgment normal.             At the end of the encounter, I discussed results, diagnosis, medications. Discussed red flags for immediate return to clinic/ER, as well as indications for follow up if no improvement. Patient understood and agreed to plan. Patient was stable for discharge.    MALIKA Hahn [facility]

## 2025-05-22 NOTE — PROGRESS NOTES
"Urgent Care Clinic Visit    Chief Complaint   Patient presents with    Dental Problem     Lt upper \"broken off adult tooth\" for few years x ongoing 1.5 years but painful last night. Lt side headaches, swelling, sore and tender. APAP and IBU last does 2 hours prior to uc arrival.               5/22/2025    12:21 PM   Additional Questions   Roomed by Filippo Fernandez MA   Accompanied by Self         Filippo Fernandez MA on 05/22/2025 at 12:24 PM  "

## 2025-05-30 ENCOUNTER — TELEPHONE (OUTPATIENT)
Dept: BEHAVIORAL HEALTH | Facility: CLINIC | Age: 45
End: 2025-05-30
Payer: COMMERCIAL

## 2025-05-30 NOTE — TELEPHONE ENCOUNTER
Writer reviewed chart and [facility] assessment. Pt is diagnosed with Paranoid Schizophrenia. He is followed by [facility] court and recommended to go to treatment by [facility] court. Pt has a pending domestic violence charge.     Writer called [facility] lizandro Sellers and left VM with call back number.

## 2025-05-30 NOTE — TELEPHONE ENCOUNTER
5/30/2025    Received external referral from [facility]. Emailed referral to [facility] admissions team and faxed to [facility].     Bettye Case [facility] - Patient Navigator   [email or web address]  Direct phone: [phone]

## 2025-06-02 NOTE — TELEPHONE ENCOUNTER
Pt called writer back and stated he is looking for outpatient treatment as he is currently in a residential program. Writer gave him the contact information for outpatient intake.

## 2025-06-02 NOTE — TELEPHONE ENCOUNTER
Pt called and left VM asking for call back.    Writer called pt and left VM with call back number.

## 2025-06-03 NOTE — TELEPHONE ENCOUNTER
Spoke to client reg IOP, he is interested in starting an IOP vs residential after completing his current LOC at [facility]. Since [facility] is scheduled full and did not have immediate openings for tomorrow 6/4, client decided to work with his current counselor at finding different placement.

## 2025-07-31 ENCOUNTER — TELEPHONE (OUTPATIENT)
Dept: INTERNAL MEDICINE | Facility: CLINIC | Age: 45
End: 2025-07-31
Payer: MEDICAID

## 2025-07-31 DIAGNOSIS — T79.A21S TRAUMATIC COMPARTMENT SYNDROME OF RIGHT LOWER EXTREMITY, SEQUELA: Primary | ICD-10-CM

## 2025-08-26 DIAGNOSIS — M54.50 CHRONIC BILATERAL LOW BACK PAIN WITHOUT SCIATICA: ICD-10-CM

## 2025-08-26 DIAGNOSIS — G89.29 CHRONIC BILATERAL LOW BACK PAIN WITHOUT SCIATICA: ICD-10-CM

## 2025-08-27 RX ORDER — HYDROXYZINE HYDROCHLORIDE 25 MG/1
TABLET, FILM COATED ORAL
Qty: 90 TABLET | Refills: 0 | Status: SHIPPED | OUTPATIENT
Start: 2025-08-27